# Patient Record
Sex: MALE | Race: WHITE | NOT HISPANIC OR LATINO | Employment: OTHER | URBAN - METROPOLITAN AREA
[De-identification: names, ages, dates, MRNs, and addresses within clinical notes are randomized per-mention and may not be internally consistent; named-entity substitution may affect disease eponyms.]

---

## 2017-03-23 ENCOUNTER — OFFICE VISIT (OUTPATIENT)
Dept: OPTOMETRY | Facility: CLINIC | Age: 72
End: 2017-03-23
Payer: COMMERCIAL

## 2017-03-23 ENCOUNTER — LAB VISIT (OUTPATIENT)
Dept: LAB | Facility: HOSPITAL | Age: 72
End: 2017-03-23
Attending: INTERNAL MEDICINE
Payer: COMMERCIAL

## 2017-03-23 ENCOUNTER — OFFICE VISIT (OUTPATIENT)
Dept: INTERNAL MEDICINE | Facility: CLINIC | Age: 72
End: 2017-03-23
Payer: COMMERCIAL

## 2017-03-23 VITALS
HEART RATE: 68 BPM | HEIGHT: 69 IN | BODY MASS INDEX: 26.51 KG/M2 | TEMPERATURE: 98 F | WEIGHT: 179 LBS | SYSTOLIC BLOOD PRESSURE: 138 MMHG | RESPIRATION RATE: 18 BRPM | DIASTOLIC BLOOD PRESSURE: 75 MMHG

## 2017-03-23 DIAGNOSIS — Z96.1 PSEUDOPHAKIA OF BOTH EYES: ICD-10-CM

## 2017-03-23 DIAGNOSIS — K76.0 FATTY LIVER: ICD-10-CM

## 2017-03-23 DIAGNOSIS — L30.9 DERMATITIS: ICD-10-CM

## 2017-03-23 DIAGNOSIS — N40.1 BPH WITH URINARY OBSTRUCTION: ICD-10-CM

## 2017-03-23 DIAGNOSIS — E78.5 HYPERLIPIDEMIA, UNSPECIFIED HYPERLIPIDEMIA TYPE: ICD-10-CM

## 2017-03-23 DIAGNOSIS — R97.20 ELEVATED PROSTATE SPECIFIC ANTIGEN (PSA): ICD-10-CM

## 2017-03-23 DIAGNOSIS — Z00.00 ANNUAL PHYSICAL EXAM: Primary | ICD-10-CM

## 2017-03-23 DIAGNOSIS — Z00.00 ANNUAL PHYSICAL EXAM: ICD-10-CM

## 2017-03-23 DIAGNOSIS — N13.8 BPH WITH URINARY OBSTRUCTION: ICD-10-CM

## 2017-03-23 DIAGNOSIS — Z23 NEED FOR STREPTOCOCCUS PNEUMONIAE VACCINATION: ICD-10-CM

## 2017-03-23 DIAGNOSIS — I10 ESSENTIAL HYPERTENSION: Primary | ICD-10-CM

## 2017-03-23 LAB
25(OH)D3+25(OH)D2 SERPL-MCNC: 23 NG/ML
ALBUMIN SERPL BCP-MCNC: 4.1 G/DL
ALP SERPL-CCNC: 73 U/L
ALT SERPL W/O P-5'-P-CCNC: 24 U/L
ANION GAP SERPL CALC-SCNC: 6 MMOL/L
AST SERPL-CCNC: 22 U/L
BASOPHILS # BLD AUTO: 0.04 K/UL
BASOPHILS NFR BLD: 0.6 %
BILIRUB SERPL-MCNC: 1 MG/DL
BILIRUB UR QL STRIP: NEGATIVE
BUN SERPL-MCNC: 18 MG/DL
CALCIUM SERPL-MCNC: 9.9 MG/DL
CHLORIDE SERPL-SCNC: 107 MMOL/L
CHOLEST/HDLC SERPL: 3.6 {RATIO}
CLARITY UR REFRACT.AUTO: CLEAR
CO2 SERPL-SCNC: 29 MMOL/L
COLOR UR AUTO: NORMAL
COMPLEXED PSA SERPL-MCNC: 4.1 NG/ML
CREAT SERPL-MCNC: 1.3 MG/DL
DIFFERENTIAL METHOD: ABNORMAL
EOSINOPHIL # BLD AUTO: 0.2 K/UL
EOSINOPHIL NFR BLD: 2.7 %
ERYTHROCYTE [DISTWIDTH] IN BLOOD BY AUTOMATED COUNT: 13 %
EST. GFR  (AFRICAN AMERICAN): >60 ML/MIN/1.73 M^2
EST. GFR  (NON AFRICAN AMERICAN): 55.7 ML/MIN/1.73 M^2
GLUCOSE SERPL-MCNC: 114 MG/DL
GLUCOSE UR QL STRIP: NEGATIVE
HCT VFR BLD AUTO: 43.9 %
HDL/CHOLESTEROL RATIO: 27.9 %
HDLC SERPL-MCNC: 136 MG/DL
HDLC SERPL-MCNC: 38 MG/DL
HGB BLD-MCNC: 15.7 G/DL
HGB UR QL STRIP: NEGATIVE
KETONES UR QL STRIP: NEGATIVE
LDLC SERPL CALC-MCNC: 80.2 MG/DL
LEUKOCYTE ESTERASE UR QL STRIP: NEGATIVE
LYMPHOCYTES # BLD AUTO: 1.3 K/UL
LYMPHOCYTES NFR BLD: 18.6 %
MCH RBC QN AUTO: 31.7 PG
MCHC RBC AUTO-ENTMCNC: 35.8 %
MCV RBC AUTO: 89 FL
MONOCYTES # BLD AUTO: 0.5 K/UL
MONOCYTES NFR BLD: 7.7 %
NEUTROPHILS # BLD AUTO: 4.9 K/UL
NEUTROPHILS NFR BLD: 70.3 %
NITRITE UR QL STRIP: NEGATIVE
NONHDLC SERPL-MCNC: 98 MG/DL
PH UR STRIP: 5 [PH] (ref 5–8)
PLATELET # BLD AUTO: 293 K/UL
PMV BLD AUTO: 9.8 FL
POTASSIUM SERPL-SCNC: 4.8 MMOL/L
PROT SERPL-MCNC: 7.5 G/DL
PROT UR QL STRIP: NEGATIVE
RBC # BLD AUTO: 4.96 M/UL
SODIUM SERPL-SCNC: 142 MMOL/L
SP GR UR STRIP: 1.01 (ref 1–1.03)
TRIGL SERPL-MCNC: 89 MG/DL
TSH SERPL DL<=0.005 MIU/L-ACNC: 1.23 UIU/ML
URN SPEC COLLECT METH UR: NORMAL
UROBILINOGEN UR STRIP-ACNC: NEGATIVE EU/DL
WBC # BLD AUTO: 7 K/UL

## 2017-03-23 PROCEDURE — 99397 PER PM REEVAL EST PAT 65+ YR: CPT | Mod: 25,S$GLB,, | Performed by: INTERNAL MEDICINE

## 2017-03-23 PROCEDURE — 3075F SYST BP GE 130 - 139MM HG: CPT | Mod: S$GLB,,, | Performed by: INTERNAL MEDICINE

## 2017-03-23 PROCEDURE — 90471 IMMUNIZATION ADMIN: CPT | Mod: S$GLB,,, | Performed by: INTERNAL MEDICINE

## 2017-03-23 PROCEDURE — 84153 ASSAY OF PSA TOTAL: CPT

## 2017-03-23 PROCEDURE — 84443 ASSAY THYROID STIM HORMONE: CPT

## 2017-03-23 PROCEDURE — 92014 COMPRE OPH EXAM EST PT 1/>: CPT | Mod: S$GLB,,, | Performed by: OPTOMETRIST

## 2017-03-23 PROCEDURE — 81003 URINALYSIS AUTO W/O SCOPE: CPT

## 2017-03-23 PROCEDURE — 3078F DIAST BP <80 MM HG: CPT | Mod: S$GLB,,, | Performed by: OPTOMETRIST

## 2017-03-23 PROCEDURE — 99999 PR PBB SHADOW E&M-EST. PATIENT-LVL III: CPT | Mod: PBBFAC,,, | Performed by: INTERNAL MEDICINE

## 2017-03-23 PROCEDURE — 82306 VITAMIN D 25 HYDROXY: CPT

## 2017-03-23 PROCEDURE — 3074F SYST BP LT 130 MM HG: CPT | Mod: S$GLB,,, | Performed by: OPTOMETRIST

## 2017-03-23 PROCEDURE — 85025 COMPLETE CBC W/AUTO DIFF WBC: CPT

## 2017-03-23 PROCEDURE — 3078F DIAST BP <80 MM HG: CPT | Mod: S$GLB,,, | Performed by: INTERNAL MEDICINE

## 2017-03-23 PROCEDURE — 99999 PR PBB SHADOW E&M-EST. PATIENT-LVL II: CPT | Mod: PBBFAC,,, | Performed by: OPTOMETRIST

## 2017-03-23 PROCEDURE — 80061 LIPID PANEL: CPT

## 2017-03-23 PROCEDURE — 80053 COMPREHEN METABOLIC PANEL: CPT

## 2017-03-23 PROCEDURE — 36415 COLL VENOUS BLD VENIPUNCTURE: CPT

## 2017-03-23 PROCEDURE — 83036 HEMOGLOBIN GLYCOSYLATED A1C: CPT

## 2017-03-23 PROCEDURE — 90670 PCV13 VACCINE IM: CPT | Mod: S$GLB,,, | Performed by: INTERNAL MEDICINE

## 2017-03-23 RX ORDER — CLOTRIMAZOLE AND BETAMETHASONE DIPROPIONATE 10; .64 MG/G; MG/G
CREAM TOPICAL 2 TIMES DAILY PRN
Qty: 60 G | Refills: 1 | Status: SHIPPED | OUTPATIENT
Start: 2017-03-23 | End: 2018-03-23

## 2017-03-23 NOTE — MR AVS SNAPSHOT
[facility]  [address]  [facility] [address]  Phone: [phone]  Fax: [phone]                  Jessee Dubose   3/23/2017 8:00 AM   Office Visit    Description:  Male : 1945   Provider:  Elizabeth Bunch MD   Department:  [facility]           Reason for Visit     Annual Exam           Diagnoses this Visit        Comments    Annual physical exam    -  Primary     BPH with urinary obstruction         Hyperlipidemia, unspecified hyperlipidemia type         Fatty liver         Elevated prostate specific antigen (PSA)         Need for Streptococcus pneumoniae vaccination         Dermatitis                To Do List           Future Appointments        Provider Department Dept Phone    3/23/2017 1:00 PM Chantell Guan, OD [facility] [phone]      Goals (5 Years of Data)     None      Follow-Up and Disposition     Return in about 1 year (around 3/23/2018).       These Medications        Disp Refills Start End    clotrimazole-betamethasone 1-0.05% (LOTRISONE) cream 60 g 1 3/23/2017 3/23/2018    Apply topically 2 (two) times daily as needed. - Topical (Top)    Pharmacy: [facility] [address] [place] [id number] [address]  #: [phone]         [facility]     [facility] Nurse Care [facility] -  Assistance  Registered nurses in the [facility] provide clinical advisement, health education, appointment booking, and other advisory services.  Call for this free service at [phone].             Medications           Message regarding Medications     Verify the changes and/or additions to your medication regime listed below are the same as discussed with your clinician today.  If any of these changes or additions are incorrect, please notify your healthcare provider.        START taking these NEW medications        Refills    clotrimazole-betamethasone 1-0.05% (LOTRISONE) cream 1    Sig: Apply topically 2 (two)  "times daily as needed.    Class: Normal    Route: Topical (Top)           Verify that the below list of medications is an accurate representation of the medications you are currently taking.  If none reported, the list may be blank. If incorrect, please contact your healthcare provider. Carry this list with you in case of emergency.           Current Medications     aspirin (ECOTRIN) 81 MG EC tablet Take 1 tablet (81 mg total) by mouth once daily.    atorvastatin (LIPITOR) 10 MG tablet TAKE ONE TABLET BY MOUTH ONCE DAILY    co-enzyme Q-10 30 mg capsule Take 30 mg by mouth 3 (three) times daily.    fish oil-omega-3 fatty acids 300-1,000 mg capsule Take by mouth once daily.    clotrimazole-betamethasone 1-0.05% (LOTRISONE) cream Apply topically 2 (two) times daily as needed.    olopatadine (PATANOL) 0.1 % ophthalmic solution Place 1 drop into both eyes 2 (two) times daily.           Clinical Reference Information           Your Vitals Were     BP Pulse Temp Resp Height Weight    138/75 68 98 °F (36.7 °C) (Oral) 18 5' 9" (1.753 m) 81.2 kg (179 lb 0.2 oz)    BMI                26.44 kg/m2          Blood Pressure          Most Recent Value    BP  138/75      Allergies as of 3/23/2017     Hay Fever And Allergy Relief      Immunizations Administered on Date of Encounter - 3/23/2017     Name Date Dose VIS Date Route    Pneumococcal Conjugate - 13 Valent  Incomplete 0.5 mL 11/5/2015 Intramuscular      Orders Placed During Today's Visit      Normal Orders This Visit    Pneumococcal Conjugate Vaccine (13 Valent) (IM)     Urinalysis     Future Labs/Procedures Expected by Expires    CBC auto differential  3/23/2017 5/22/2018    Comprehensive metabolic panel  3/23/2017 5/22/2018    Hemoglobin A1c  3/23/2017 5/22/2018    Lipid panel  3/23/2017 5/22/2018    PROSTATE SPECIFIC ANTIGEN, DIAGNOSTIC  3/23/2017 5/22/2018    TSH  3/23/2017 5/22/2018    Vitamin D  3/23/2017 5/22/2018      [facility] Sign-Up     Activating your [facility] " account is as easy as 1-2-3!     1) Visit [email or web address], select Sign Up Now, enter this activation code and your date of birth, then select Next.  [email or web address]  Expires: 5/7/2017  8:38 AM      2) Create a username and password to use when you visit [facility] in the future and select a security question in case you lose your password and select Next.    3) Enter your e-mail address and click Sign Up!    Additional Information  If you have questions, please e-mail [email or web address] or call [phone] to talk to our [facility] staff. Remember, [facility] is NOT to be used for urgent needs. For medical emergencies, dial 911.         Instructions    Metamucil over the counter daily. Drink plenty of water.        Language Assistance Services     ATTENTION: Language assistance services are available, free of charge. Please call [phone].      ATENCIÓN: Si habla aline, tiene a cifuentes disposición servicios gratuitos de asistencia lingüística. Llame al [phone].     MARIEL Ý: N?u b?n nói Ti?ng Vi?t, có các d?ch v? h? tr? ngôn ng? mi?n phí dành cho b?n. G?i s? [phone].         [facility] complies with applicable Federal civil rights laws and does not discriminate on the basis of race, color, national origin, age, disability, or sex.

## 2017-03-23 NOTE — PROGRESS NOTES
INTERNAL MEDICINE ANNUAL VISIT NOTE      CHIEF COMPLAINT     Chief Complaint   Patient presents with    Annual Exam       HPI     Jessee Dubose is a 71 y.o. C male who presents for annual exam.    Constipation and stool is sometimes hard. Uses OTC stool softener. Sometimes works. Tried prune juice but only sometimes works.  Reports eats a good amount of diet. Reports most of the time, 1 BM daily and most times hard like neelima per pt. No abdominal pains/blood in the stools. Sometimes does get an upset stomach when drinking coffee on an empty stomach.  Cscope 8/10/16 - normal. Repeat in 5 days.     Bikes about 20 min 5 days a week. No issues while biking. No CP/SOB/HOOVER.     Past Medical History:  Past Medical History:   Diagnosis Date    Allergy     Cataract     Diverticulosis     Hyperlipidemia     Joint pain     SMA stenosis     Steatosis of liver        Past Surgical History:  Past Surgical History:   Procedure Laterality Date    APPENDECTOMY      CATARACT EXTRACTION  11/4/13    right & left eye    CATARACT EXTRACTION W/  INTRAOCULAR LENS IMPLANT  11/18/13    Left Eye/ Dr Gupta    COLONOSCOPY N/A 8/10/2016    Procedure: COLONOSCOPY;  Surgeon: Adolfo Moore MD;  Location: [address];  Service: Endoscopy;  Laterality: N/A;  Patient has a history of Colon Polyps.       Allergies:  Review of patient's allergies indicates:   Allergen Reactions    Hay fever and allergy relief Other (See Comments)     Runny nose, sinus congestion.       Home Medications:  Prior to Admission medications    Medication Sig Start Date End Date Taking? Authorizing Provider   atorvastatin (LIPITOR) 10 MG tablet TAKE ONE TABLET BY MOUTH ONCE DAILY 6/13/16  Yes Elizabeth Bunch MD   co-enzyme Q-10 30 mg capsule Take 30 mg by mouth 3 (three) times daily.   Yes Historical Provider, MD   fish oil-omega-3 fatty acids 300-1,000 mg capsule Take by mouth once daily.   Yes Historical Provider, MD   aspirin (ECOTRIN) 81 MG EC tablet  "Take 1 tablet (81 mg total) by mouth once daily. 2/25/15 5/13/16  Moi Salazar MD   olopatadine (PATANOL) 0.1 % ophthalmic solution Place 1 drop into both eyes 2 (two) times daily. 10/1/12 8/10/16  Rodrigo Montalvo, OD       Family History:  Family History   Problem Relation Age of Onset    Heart disease Father 66     MI    No Known Problems Brother     Cataracts Maternal Grandmother     Diabetes Maternal Grandmother     Cancer Maternal Grandmother     Diabetes Paternal Grandmother     Glaucoma Neg Hx     Blindness Neg Hx     Melanoma Neg Hx        Social History:  Social History   Substance Use Topics    Smoking status: Never Smoker    Smokeless tobacco: None    Alcohol use Yes      Comment: 1 beer every few months       Review of Systems:  Review of Systems    Health Maintainence:   Immunizations:   TDap is up to date, Influenza is up to date, Pneumovax is up to date. Zostavax is UTD. Prevnar today.   Cancer Screening:  Colonoscopy: is up to date. 8/10/16  Screening:  Hepatitis C is up to date in pts born between 2914-5512.     PHYSICAL EXAM     BP (!) 142/78  Pulse 68  Temp 98 °F (36.7 °C) (Oral)   Resp 18  Ht 5' 9" (1.753 m)  Wt 81.2 kg (179 lb 0.2 oz)  BMI 26.44 kg/m2    GEN - A+OX4, NAD   HEENT - PERRL, EOMI, OP clear. MMM. Missing teeth.  Neck - No thyromegaly or cervical LAD. No thyroid masses felt.  CV - RRR, no m/r   Chest - CTAB, no wheezing or rhonchi  Abd - S/NT/ND/+BS.   Ext - 2+BPT and radial pulses. No LE edema.   Neuro - PERRL, EOMI, no nystagmus, eyebrow raise, facial sensation, hearing, m of mastication, smile, palatal raise, shoulder shrug, tongue protrusion symmetric and intact.  5/5 BUE and BLE strength. Sensation to light touch intact throughout. 2+ DTRs. Normal gait.   MSK - no spinal tenderness to palpation.   Skin - upper back w/ patches of redness.     LABS     Previous labs reviewed.     ASSESSMENT/PLAN     Jessee Dubose is a 71 y.o. male with  Jessee was " seen today for annual exam.    Diagnoses and all orders for this visit:    Annual physical exam  -     CBC auto differential; Future; Expected date: 3/23/17  -     Hemoglobin A1c; Future; Expected date: 3/23/17  -     Lipid panel; Future; Expected date: 3/23/17  -     Comprehensive metabolic panel; Future; Expected date: 3/23/17  -     TSH; Future; Expected date: 3/23/17  -     Vitamin D; Future; Expected date: 3/23/17  -     Urinalysis  -     PROSTATE SPECIFIC ANTIGEN, DIAGNOSTIC; Future; Expected date: 3/23/17    BPH with urinary obstruction - s/p benign TRUS biopsy 2016 w/ Dr. Grier.  -     Urinalysis  -     PROSTATE SPECIFIC ANTIGEN, DIAGNOSTIC; Future; Expected date: 3/23/17    Hyperlipidemia, unspecified hyperlipidemia type - cont atorvastatin 40mg daily especially given SMA stenosis. However asymptomatic. Seen Dr. VAZQUEZ in the past.  -     Lipid panel; Future; Expected date: 3/23/17  -     Comprehensive metabolic panel; Future; Expected date: 3/23/17    Fatty liver  -     Comprehensive metabolic panel; Future; Expected date: 3/23/17    Elevated prostate specific antigen (PSA)  -     Urinalysis  -     PROSTATE SPECIFIC ANTIGEN, DIAGNOSTIC; Future; Expected date: 3/23/17    Need for Streptococcus pneumoniae vaccination  -     Pneumococcal Conjugate Vaccine (13 Valent) (IM)    Dermatitis  -     clotrimazole-betamethasone 1-0.05% (LOTRISONE) cream; Apply topically 2 (two) times daily as needed.    Constipation -   Metamucil over the counter daily. Drink plenty of water.     RTC in 12 months, sooner if needed and depending on labs.    Elizabeth Bunch MD  Department of Internal Medicine - [facility]  8:18 AM

## 2017-03-23 NOTE — PROGRESS NOTES
HPI     Pt states: no changes in vision, here for ocular health check up. Using   otc readers +1.25 only. Using [facility] gtts apprx 2x per week    PATIENT'S LAST DFE WAS 1/11/2016       Last edited by Katia Headley, PCT on 3/23/2017  1:10 PM.     ROS     Negative for: Constitutional, Gastrointestinal, Neurological, Skin,   Genitourinary, Musculoskeletal, HENT, Endocrine, Cardiovascular, Eyes,   Respiratory, Psychiatric, Allergic/Imm, Heme/Lymph    Last edited by Chantell Guan, OD on 3/23/2017  1:55 PM. (History)        Assessment /Plan     For exam results, see Encounter Report.    Essential hypertension    Pseudophakia of both eyes            1.  No retinopathy--monitor yearly.  Eye health normal OU.  2.  No rx given.  Continue with OTC readers.           RTC 1 year for routine exam.

## 2017-03-24 LAB
ESTIMATED AVG GLUCOSE: 103 MG/DL
HBA1C MFR BLD HPLC: 5.2 %

## 2017-03-27 ENCOUNTER — TELEPHONE (OUTPATIENT)
Dept: INTERNAL MEDICINE | Facility: CLINIC | Age: 72
End: 2017-03-27

## 2017-03-27 NOTE — TELEPHONE ENCOUNTER
Please call and let pt know: PSA is very mildly elevated, which is much better than PSA level from last year. Follow up with Dr. Grier if he has any urinary symptoms. Vit D is low. I rec Vit D 1000 units OTC daily. Thyroid and liver function is normal. Kidney function is stable. Cholesterol is controlled. He does not have diabetes. Urinalysis is normal.

## 2017-03-29 ENCOUNTER — TELEPHONE (OUTPATIENT)
Dept: INTERNAL MEDICINE | Facility: CLINIC | Age: 72
End: 2017-03-29

## 2017-03-29 NOTE — TELEPHONE ENCOUNTER
----- Message from Jenny Bowie sent at 3/28/2017  3:48 PM CDT -----  Contact: pt   Patient would like to get test results.  Name of test (lab, mammo, etc.):  labs  Date of test:  3/23  Ordering provider: Alivia  Where was the test performed: Primary care   Comments:

## 2017-06-20 RX ORDER — ATORVASTATIN CALCIUM 10 MG/1
TABLET, FILM COATED ORAL
Qty: 30 TABLET | Refills: 11 | Status: SHIPPED | OUTPATIENT
Start: 2017-06-20 | End: 2017-06-21 | Stop reason: SDUPTHER

## 2017-06-21 RX ORDER — ATORVASTATIN CALCIUM 10 MG/1
10 TABLET, FILM COATED ORAL DAILY
Qty: 90 TABLET | Refills: 3 | Status: SHIPPED | OUTPATIENT
Start: 2017-06-21 | End: 2018-05-29 | Stop reason: SDUPTHER

## 2017-06-21 NOTE — TELEPHONE ENCOUNTER
----- Message from Mili Best sent at 6/20/2017  1:37 PM CDT -----  Contact: Self/[phone]  Pt said that he is calling in regards to needing to know if he can get a 90 day supply for his Atorvastatin, so he does not have to call the pharmacy every month.                 Thanks!!!!

## 2018-02-01 ENCOUNTER — TELEPHONE (OUTPATIENT)
Dept: INTERNAL MEDICINE | Facility: CLINIC | Age: 73
End: 2018-02-01

## 2018-02-01 DIAGNOSIS — Z00.00 ANNUAL PHYSICAL EXAM: Primary | ICD-10-CM

## 2018-02-01 NOTE — TELEPHONE ENCOUNTER
----- Message from Ivone Rudolph sent at 2/1/2018 10:42 AM CST -----  Contact: Patient [phone]  Sooner appointment than the  can schedule.  Did you offer to schedule the next available appointment and put the patient on the wait list?:    When is the first available appointment: 05/01/2018  What is the nature of the appointment: annual    Please call and advise.    Thank You

## 2018-02-02 NOTE — TELEPHONE ENCOUNTER
----- Message from Dahlia Hernandez sent at 2/2/2018  3:36 PM CST -----  Lab Orders Needed    I have scheduled the above patients annual physical. Lab orders need to be placed and scheduled.    Date of Annual Physical: 05/29/2018    Thank You

## 2018-05-22 ENCOUNTER — LAB VISIT (OUTPATIENT)
Dept: LAB | Facility: HOSPITAL | Age: 73
End: 2018-05-22
Attending: INTERNAL MEDICINE
Payer: COMMERCIAL

## 2018-05-22 DIAGNOSIS — Z00.00 ANNUAL PHYSICAL EXAM: ICD-10-CM

## 2018-05-22 LAB
ALBUMIN SERPL BCP-MCNC: 4.1 G/DL
ALP SERPL-CCNC: 69 U/L
ALT SERPL W/O P-5'-P-CCNC: 26 U/L
ANION GAP SERPL CALC-SCNC: 8 MMOL/L
AST SERPL-CCNC: 23 U/L
BASOPHILS # BLD AUTO: 0.03 K/UL
BASOPHILS NFR BLD: 0.4 %
BILIRUB SERPL-MCNC: 1 MG/DL
BUN SERPL-MCNC: 13 MG/DL
CALCIUM SERPL-MCNC: 9.4 MG/DL
CHLORIDE SERPL-SCNC: 105 MMOL/L
CHOLEST SERPL-MCNC: 158 MG/DL
CHOLEST/HDLC SERPL: 4.4 {RATIO}
CO2 SERPL-SCNC: 29 MMOL/L
COMPLEXED PSA SERPL-MCNC: 4.6 NG/ML
CREAT SERPL-MCNC: 1 MG/DL
DIFFERENTIAL METHOD: ABNORMAL
EOSINOPHIL # BLD AUTO: 0.2 K/UL
EOSINOPHIL NFR BLD: 3 %
ERYTHROCYTE [DISTWIDTH] IN BLOOD BY AUTOMATED COUNT: 12.9 %
EST. GFR  (AFRICAN AMERICAN): >60 ML/MIN/1.73 M^2
EST. GFR  (NON AFRICAN AMERICAN): >60 ML/MIN/1.73 M^2
ESTIMATED AVG GLUCOSE: 100 MG/DL
GLUCOSE SERPL-MCNC: 100 MG/DL
HBA1C MFR BLD HPLC: 5.1 %
HCT VFR BLD AUTO: 43.5 %
HDLC SERPL-MCNC: 36 MG/DL
HDLC SERPL: 22.8 %
HGB BLD-MCNC: 15.5 G/DL
LDLC SERPL CALC-MCNC: 93.4 MG/DL
LYMPHOCYTES # BLD AUTO: 1.5 K/UL
LYMPHOCYTES NFR BLD: 21.2 %
MCH RBC QN AUTO: 31.3 PG
MCHC RBC AUTO-ENTMCNC: 35.6 G/DL
MCV RBC AUTO: 88 FL
MONOCYTES # BLD AUTO: 0.5 K/UL
MONOCYTES NFR BLD: 6.8 %
NEUTROPHILS # BLD AUTO: 4.8 K/UL
NEUTROPHILS NFR BLD: 68.5 %
NONHDLC SERPL-MCNC: 122 MG/DL
PLATELET # BLD AUTO: 288 K/UL
PMV BLD AUTO: 9.6 FL
POTASSIUM SERPL-SCNC: 4.4 MMOL/L
PROT SERPL-MCNC: 7.2 G/DL
RBC # BLD AUTO: 4.96 M/UL
SODIUM SERPL-SCNC: 142 MMOL/L
TRIGL SERPL-MCNC: 143 MG/DL
TSH SERPL DL<=0.005 MIU/L-ACNC: 1.66 UIU/ML
WBC # BLD AUTO: 7.03 K/UL

## 2018-05-22 PROCEDURE — 36415 COLL VENOUS BLD VENIPUNCTURE: CPT

## 2018-05-22 PROCEDURE — 84443 ASSAY THYROID STIM HORMONE: CPT

## 2018-05-22 PROCEDURE — 80053 COMPREHEN METABOLIC PANEL: CPT

## 2018-05-22 PROCEDURE — 83036 HEMOGLOBIN GLYCOSYLATED A1C: CPT

## 2018-05-22 PROCEDURE — 85025 COMPLETE CBC W/AUTO DIFF WBC: CPT

## 2018-05-22 PROCEDURE — 84153 ASSAY OF PSA TOTAL: CPT

## 2018-05-22 PROCEDURE — 80061 LIPID PANEL: CPT

## 2018-05-29 ENCOUNTER — OFFICE VISIT (OUTPATIENT)
Dept: INTERNAL MEDICINE | Facility: CLINIC | Age: 73
End: 2018-05-29
Payer: COMMERCIAL

## 2018-05-29 VITALS
WEIGHT: 176.13 LBS | TEMPERATURE: 98 F | HEIGHT: 69 IN | DIASTOLIC BLOOD PRESSURE: 65 MMHG | BODY MASS INDEX: 26.09 KG/M2 | SYSTOLIC BLOOD PRESSURE: 140 MMHG | HEART RATE: 62 BPM

## 2018-05-29 DIAGNOSIS — K55.1 SMA STENOSIS: ICD-10-CM

## 2018-05-29 DIAGNOSIS — I70.0 AORTIC ATHEROSCLEROSIS: ICD-10-CM

## 2018-05-29 DIAGNOSIS — R97.20 ELEVATED PROSTATE SPECIFIC ANTIGEN (PSA): ICD-10-CM

## 2018-05-29 DIAGNOSIS — Z00.00 ANNUAL PHYSICAL EXAM: Primary | ICD-10-CM

## 2018-05-29 DIAGNOSIS — E78.5 HYPERLIPIDEMIA, UNSPECIFIED HYPERLIPIDEMIA TYPE: ICD-10-CM

## 2018-05-29 PROCEDURE — 99397 PER PM REEVAL EST PAT 65+ YR: CPT | Mod: S$GLB,,, | Performed by: INTERNAL MEDICINE

## 2018-05-29 PROCEDURE — 3078F DIAST BP <80 MM HG: CPT | Mod: CPTII,S$GLB,, | Performed by: INTERNAL MEDICINE

## 2018-05-29 PROCEDURE — 3077F SYST BP >= 140 MM HG: CPT | Mod: CPTII,S$GLB,, | Performed by: INTERNAL MEDICINE

## 2018-05-29 PROCEDURE — 99999 PR PBB SHADOW E&M-EST. PATIENT-LVL IV: CPT | Mod: PBBFAC,,, | Performed by: INTERNAL MEDICINE

## 2018-05-29 RX ORDER — ATORVASTATIN CALCIUM 10 MG/1
10 TABLET, FILM COATED ORAL DAILY
Qty: 90 TABLET | Refills: 3 | Status: SHIPPED | OUTPATIENT
Start: 2018-05-29 | End: 2019-03-13 | Stop reason: SDUPTHER

## 2018-05-29 NOTE — PROGRESS NOTES
INTERNAL MEDICINE ANNUAL VISIT NOTE      CHIEF COMPLAINT     ANNUAL    HPI     Jessee Dubose is a 72 y.o. C male who presents for annual exam.    Pt is well known to me.   Some rhinorrhea. Takes OTC antihistamine. Stationary bike occasionally - 20 min. Does not break a sweat. Does this 5 days a week.     H/o colitis in 2015. Mesenteric US w/ stenosis. Seen Dr. VAZQUEZ in 2015. Asymptomatic. No issues w/ abdominal pain. No bloody stools.     Past Medical History:  Past Medical History:   Diagnosis Date    Allergy     Cataract     Diverticulosis     Hyperlipidemia     Joint pain     SMA stenosis     Steatosis of liver        Past Surgical History:  Past Surgical History:   Procedure Laterality Date    APPENDECTOMY      CATARACT EXTRACTION  11/4/13    right & left eye    CATARACT EXTRACTION W/  INTRAOCULAR LENS IMPLANT  11/18/13    Left Eye/ Dr Gupta    COLONOSCOPY N/A 8/10/2016    Procedure: COLONOSCOPY;  Surgeon: Adolfo Moore MD;  Location: [address]);  Service: Endoscopy;  Laterality: N/A;  Patient has a history of Colon Polyps.       Allergies:  Review of patient's allergies indicates:   Allergen Reactions    Hay fever and allergy relief Other (See Comments)     Runny nose, sinus congestion.       Home Medications:  Prior to Admission medications    Medication Sig Start Date End Date Taking? Authorizing Provider   aspirin (ECOTRIN) 81 MG EC tablet Take 1 tablet (81 mg total) by mouth once daily. 2/25/15 3/23/17  Moi Salazar MD   atorvastatin (LIPITOR) 10 MG tablet Take 1 tablet (10 mg total) by mouth once daily. 6/21/17   Elizabeth Bunch MD   co-enzyme Q-10 30 mg capsule Take 30 mg by mouth 3 (three) times daily.    Historical Provider, MD   fish oil-omega-3 fatty acids 300-1,000 mg capsule Take by mouth once daily.    Historical Provider, MD   olopatadine (PATANOL) 0.1 % ophthalmic solution Place 1 drop into both eyes 2 (two) times daily. 10/1/12 8/10/16  Rodrigo Montalvo, OD  "      Family History:  Family History   Problem Relation Age of Onset    Heart disease Father 66        MI    No Known Problems Brother     Cataracts Maternal Grandmother     Diabetes Maternal Grandmother     Cancer Maternal Grandmother     Diabetes Paternal Grandmother     Glaucoma Neg Hx     Blindness Neg Hx     Melanoma Neg Hx        Social History:  Social History   Substance Use Topics    Smoking status: Never Smoker    Smokeless tobacco: Not on file    Alcohol use Yes      Comment: 1 beer every few months       Review of Systems:  Review of Systems   Constitutional: Negative for chills and fever.   HENT: Positive for rhinorrhea. Negative for congestion and postnasal drip.    Respiratory: Negative for cough, shortness of breath and wheezing.    Cardiovascular: Negative for chest pain and palpitations.   Gastrointestinal: Positive for constipation (occasional. takes fiber supplement and this works.). Negative for abdominal pain, blood in stool, diarrhea, nausea and vomiting.   Genitourinary: Negative for dysuria, frequency and hematuria.   Musculoskeletal: Negative.    Skin: Negative for rash.   Neurological: Positive for light-headedness (possibly once a mo. very transient. no other assoc symptoms). Negative for dizziness, weakness and headaches.       Health Maintainence:   Td 2011  Flu - out of season.  Prevnar 3/23/17  Pneumovax 3/3/16  Zoster 3/4/15  C-SCOPE 8/10/16 - repeat in 5 yrs. Normal   Hep C screening 11/1/13    PHYSICAL EXAM     BP (!) 140/65   Pulse 62   Temp 98.4 °F (36.9 °C)   Ht 5' 9" (1.753 m)   Wt 79.9 kg (176 lb 2.4 oz)   BMI 26.01 kg/m²     GEN - A+OX4, NAD   HEENT - PERRL, EOMI, OP clear. MMM.   Neck - No thyromegaly or cervical LAD. No thyroid masses felt.  CV - RRR, no m/r   Chest - CTAB, no wheezing or rhonchi  Abd - S/NT/ND/+BS.   Ext - 2+BDP and radial pulses. No LE edema.   Neuro - PERRL, EOMI, no nystagmus, eyebrow raise, facial sensation, hearing, m of mastication, " smile, palatal raise, shoulder shrug, tongue protrusion symmetric and intact. 5/5 BUE and BLE strength. Sensation to light touch intact throughout. 2+ DTRs. Normal gait.   MSK - No spinal tenderness to palpation. Normal gait.   Skin - No rash.    LABS     Previous labs reviewed.    Mesenteric US 2/23/15   SMA velocity is high suggesting SMA stenosis.    CT AP 2/22/15   Examination of the lung bases demonstrates no evidence of consolidation or pleural fluid.  Visualized portions of the heart demonstrates evidence of enlargement or disease.    The liver is normal in size demonstrating diffusely decreased attenuation relative to the spleen most compatible with hepatic steatosis.  If focal hepatic parenchymal and amount is identified.  No evidence of biliary system dilatation.  The gallbladder   is unremarkable.  The portal and splenic veins are patent.    The stomach, spleen, pancreas, and bilateral adrenal glands are unremarkable.  There is positive contrast identified within the distal esophagus which is nonspecific but may suggest reflux.    The bilateral kidneys are normal in size and location and enhance appropriately without evidence of solid renal masses or hydronephrosis.  Both kidneys concentrate and excrete contrast appropriately imaging.  No evidence for nephroureterolithiasis or   ureteral obstruction.  The urinary bladder and prostate demonstrate no focal abnormality.    The abdominal aorta is normal in course and caliber demonstrate moderate calcific atherosclerosis which extends into its branch vessels.    Examination of the colon demonstrates a short segment of diffuse mural thickening with portions of the distal descending colon wall measuring up to 6-7 mm in thickness. This segment contains a single diverticulum. There is mild pericolonic stranding of   the adjacent fat suggesting a reactive inflammatory process and edema. There is trace free fluid inferiorly in the lower left pelvis. No evidence of  obstruction in this region as positive contrast identified throughout the colon to the level of the cecum   after the initiation of rectal contrast.  No evidence of associated lymphadenopathy within the abdomen or pelvis.  No pneumatosis or intraperitoneal free air.  Overall these findings are most compatible with a focal colitis with an underlying neoplastic   process felt less likely although cannot be entirely excluded based on this examination.  The remaining portions of the large and small bowel demonstrate no convincing evidence of disease.      When viewed with bone windows the osseous structures are unremarkable.  The extraperitoneal soft tissues are unremarkable.      ASSESSMENT/PLAN     Jessee Dubose is a 72 y.o. male with  Jessee was seen today for annual exam.    Diagnoses and all orders for this visit:    Annual physical exam - reviewed labs w/ pt.     Hyperlipidemia, unspecified hyperlipidemia type  -     atorvastatin (LIPITOR) 10 MG tablet; Take 1 tablet (10 mg total) by mouth once daily.    Elevated prostate specific antigen (PSA) - f/u w/ Dr. Grier.  -     Ambulatory consult to Urology    SMA stenosis - cont asa 81 and atorva 10. If symptomatic or if worse, refer to vascular.  -     Vascular Lab () US Mesenteric Arterial; Future    Aortic atherosclerosis - asa 81 and atorva 10.    RTC in 12 months, sooner if needed and depending on labs.    Elizabeth Bunch MD  Department of Internal Medicine - [facility]  1:02 PM

## 2018-06-01 ENCOUNTER — OFFICE VISIT (OUTPATIENT)
Dept: UROLOGY | Facility: CLINIC | Age: 73
End: 2018-06-01
Payer: COMMERCIAL

## 2018-06-01 VITALS
BODY MASS INDEX: 25.72 KG/M2 | WEIGHT: 174.19 LBS | HEART RATE: 65 BPM | SYSTOLIC BLOOD PRESSURE: 153 MMHG | DIASTOLIC BLOOD PRESSURE: 71 MMHG

## 2018-06-01 DIAGNOSIS — R97.20 ELEVATED PSA: Primary | ICD-10-CM

## 2018-06-01 PROCEDURE — 3078F DIAST BP <80 MM HG: CPT | Mod: CPTII,S$GLB,, | Performed by: UROLOGY

## 2018-06-01 PROCEDURE — 99203 OFFICE O/P NEW LOW 30 MIN: CPT | Mod: S$GLB,,, | Performed by: UROLOGY

## 2018-06-01 PROCEDURE — 99999 PR PBB SHADOW E&M-EST. PATIENT-LVL III: CPT | Mod: PBBFAC,,,

## 2018-06-01 PROCEDURE — 3077F SYST BP >= 140 MM HG: CPT | Mod: CPTII,S$GLB,, | Performed by: UROLOGY

## 2018-06-01 NOTE — H&P
Subjective:       Patient ID: Jessee Dubose is a 72 y.o. male.    Chief Complaint: Elevated PSA      HPI: Jessee Dubose is a 72 y.o. White male who presents today for evaluation and management of an elevated PSA.    Underwent negative TRUS biopsy with Dr. Grier in 2016 PSA at that time was 7.3    PSA most recently is 4.6,  He endorses recent masturbation at time lab was drawn and rides a stationary bike daily. He does not take any medications for his prostate and denies taking finasteride.     The patient does not have a family history of prostate cancer or other  malignancy.     He denies hematuria and/or UTI symptoms.    He denies any voiding symptoms, nocturia x1    He has ED but is not sexually active with his wife and not interested in treating this      Review of patient's allergies indicates:   Allergen Reactions    Hay fever and allergy relief Other (See Comments)     Runny nose, sinus congestion.       Current Outpatient Prescriptions   Medication Sig Dispense Refill    aspirin (ECOTRIN) 81 MG EC tablet Take 1 tablet (81 mg total) by mouth once daily. 360 tablet 11    atorvastatin (LIPITOR) 10 MG tablet Take 1 tablet (10 mg total) by mouth once daily. 90 tablet 3    co-enzyme Q-10 30 mg capsule Take 30 mg by mouth 3 (three) times daily.      fish oil-omega-3 fatty acids 300-1,000 mg capsule Take by mouth once daily.      olopatadine (PATANOL) 0.1 % ophthalmic solution Place 1 drop into both eyes 2 (two) times daily. 5 mL 6     No current facility-administered medications for this visit.        Past Medical History:   Diagnosis Date    Allergy     Cataract     Diverticulosis     Hyperlipidemia     Joint pain     SMA stenosis     Steatosis of liver        Past Surgical History:   Procedure Laterality Date    APPENDECTOMY      CATARACT EXTRACTION  11/4/13    right & left eye    CATARACT EXTRACTION W/  INTRAOCULAR LENS IMPLANT  11/18/13    Left Eye/ Dr Gupta    COLONOSCOPY N/A  8/10/2016    Procedure: COLONOSCOPY;  Surgeon: Adolfo Moore MD;  Location: [facility] ([address] [place] [address]);  Service: Endoscopy;  Laterality: N/A;  Patient has a history of Colon Polyps.       Family History   Problem Relation Age of Onset    Heart disease Father 66        MI    No Known Problems Brother     Cataracts Maternal Grandmother     Diabetes Maternal Grandmother     Cancer Maternal Grandmother     Diabetes Paternal Grandmother     Glaucoma Neg Hx     Blindness Neg Hx     Melanoma Neg Hx        Review of Systems     Patient denies bleeding diathesis, chills, decreased size/force of stream, dysuria, fever, flank pain, frequency or urgency, hematuria, hesitancy, intermittency or feeling of incomplete emptying, stones, stress or urgency incontinence, TB or genitourinary trauma and urethral discharge.    Review of Systems   Constitutional: Negative for fever, chills, activity change, appetite change and unexpected weight change.   HENT: Negative for congestion, nosebleeds, sneezing, sore throat and trouble swallowing.    Eyes: Negative for pain, discharge, redness and visual disturbance.   Respiratory: Negative for cough, choking, chest tightness and shortness of breath.    Cardiovascular: Negative for chest pain, palpitations and leg swelling.   Gastrointestinal: Negative for nausea, vomiting, abdominal pain, diarrhea, blood in stool, abdominal distention and anal bleeding.  Genitourinary: As documented per HPI   Endocrine: Negative for cold intolerance, heat intolerance, polydipsia, polyphagia and polyuria.   Musculoskeletal: Negative for myalgias, gait problem, neck pain and neck stiffness.   Skin: Negative for color change, pallor, rash and wound.   Allergic/Immunologic: Negative for immunocompromised state.   Neurological: Negative for seizures, facial asymmetry, speech difficulty, weakness and light-headedness.   Hematological: Negative for adenopathy. Does not bruise/bleed easily.    Psychiatric/Behavioral: Negative for hallucinations, behavioral problems, self-injury and agitation. The patient is not hyperactive.    All other systems were reviewed and were negative.      Objective:     Vitals:    06/01/18 1439   BP: (!) 153/71   Pulse: 65     Physical Exam   Vitals reviewed.  Constitutional: He is oriented to person, place, and time. He appears well-developed and well-nourished. No distress.   HENT:   Head: Normocephalic and atraumatic.   Right Ear: External ear normal.   Left Ear: External ear normal.   Nose: Nose normal.   Eyes: EOM are normal. Pupils are equal, round, and reactive to light. Right eye exhibits no discharge. Left eye exhibits no discharge.   Neck: Normal range of motion. Neck supple. No tracheal deviation present. No thyroid enlargement or tenderness.  Cardiovascular: Regular rhythm and intact distal pulses. No signs of peripheral edema.    Pulmonary/Chest: Effort normal and breath sounds normal. No stridor. No respiratory distress. He has no wheezes.   Abdominal: Soft. Bowel sounds are normal. He exhibits no distension. There is no tenderness. Hernia confirmed negative in the right inguinal area and confirmed negative in the left inguinal area. No hepatic or splenic enlargement or tenderness.  Genitourinary: Penis normal. Right testis shows no mass, no swelling and no tenderness. Right testis is descended. Left testis shows no mass, no swelling and no tenderness. Left testis is descended. Circumcised. No phimosis or hypospadias.   SANGITA: 30g no nodules, smooth, no masses in rectum  Musculoskeletal: Normal range of motion. He exhibits no edema.   Neurological: He is alert and oriented to person, place, and time. He exhibits normal muscle tone. Coordination normal.   Skin: Skin is warm. No rash noted.   Lymphatic: No palpable lymph nodes.  Psychiatric: He has a normal mood and affect. His behavior is normal. Judgment and thought content normal.     Lab Results   Component Value  Date    PSA 4.6 (H) 05/22/2018    PSA 7.3 (H) 03/03/2016    PSA 3.6 10/08/2013     Lab Results   Component Value Date    CREATININE 1.0 05/22/2018     Lab Results   Component Value Date    EGFRNONAA >60 05/22/2018     Lab Results   Component Value Date    ESTGFRAFRICA >60 05/22/2018         Assessment:       1. Elevated PSA        Plan:     Jessee was seen today for elevated psa.    Diagnoses and all orders for this visit:    Elevated PSA  -     PSA, Screening; Future        The patient has an elevated PSA. I discussed with the patient the finding of an abnormal PSA test.  The patient is aware that PSA is a protein made by the prostate in both benign and malignant conditions.  We discussed the most common differential diagnosis of an elevated PSA including BPH, prostatitis (chronic and acute), PIN (a pre-cancerous condition of the prostate) and prostate cancer.  The issues of sensitivity,   specificity and the predictive values of PSA were discussed in detail.  The   patient is aware that the relevant issues regarding his risk of having prostate cancer are his PSA and any abnormalities of a SANGITA. It was explained to the patient in detail that not all men with an elevated PSA have prostate cancer nor do all men without prostate cancer have a normal PSA. We had a long discussion about the benefits and limitations of PSA testing/screening. Although imperfect, PSA testing does have some utility, and I explained what I thought were the benefits of PSA screening. Importantly, it can be helpful in identifying at risk patients for a significant prostate cancer. We spoke extensively about about his prior negative biopsy with previously higher PSA and options including no screening, MRI and repeat PSA in 1 year    After our discussion, He elected to have a repeat PSA in 1 year.    I encouraged him or any of his family members to call or email me with questions and/or concerns.    I spent 30 minutes with the patient of which  more than half was spent in coordinating the patient's care as well as in direct consultation with the patient in regards to our treatment and plan.

## 2018-06-02 NOTE — PROGRESS NOTES
I have reviewed and concur with the resident's history, physical, assessment, and plan.  I have personally interviewed and examined the patient at bedside.      Informed conversation had regarding psa screening.  Also discussed age-adjusted psa's and his current psa is wnl fo rhis age.

## 2018-07-06 RX ORDER — ATORVASTATIN CALCIUM 10 MG/1
TABLET, FILM COATED ORAL
Qty: 30 TABLET | Refills: 4 | Status: SHIPPED | OUTPATIENT
Start: 2018-07-06 | End: 2019-03-13

## 2019-03-04 ENCOUNTER — TELEPHONE (OUTPATIENT)
Dept: ADMINISTRATIVE | Facility: HOSPITAL | Age: 74
End: 2019-03-04

## 2019-03-04 DIAGNOSIS — I10 HYPERTENSION, UNSPECIFIED TYPE: ICD-10-CM

## 2019-03-04 DIAGNOSIS — E78.5 HYPERLIPIDEMIA, UNSPECIFIED HYPERLIPIDEMIA TYPE: ICD-10-CM

## 2019-03-04 DIAGNOSIS — Z00.00 ANNUAL PHYSICAL EXAM: Primary | ICD-10-CM

## 2019-03-04 DIAGNOSIS — R97.20 ELEVATED PSA: ICD-10-CM

## 2019-03-13 ENCOUNTER — OFFICE VISIT (OUTPATIENT)
Dept: INTERNAL MEDICINE | Facility: CLINIC | Age: 74
End: 2019-03-13
Payer: COMMERCIAL

## 2019-03-13 ENCOUNTER — LAB VISIT (OUTPATIENT)
Dept: LAB | Facility: HOSPITAL | Age: 74
End: 2019-03-13
Payer: COMMERCIAL

## 2019-03-13 VITALS
DIASTOLIC BLOOD PRESSURE: 70 MMHG | TEMPERATURE: 98 F | SYSTOLIC BLOOD PRESSURE: 134 MMHG | HEIGHT: 69 IN | WEIGHT: 178.13 LBS | BODY MASS INDEX: 26.38 KG/M2 | HEART RATE: 62 BPM

## 2019-03-13 DIAGNOSIS — I70.0 AORTIC ATHEROSCLEROSIS: ICD-10-CM

## 2019-03-13 DIAGNOSIS — K76.0 FATTY LIVER: ICD-10-CM

## 2019-03-13 DIAGNOSIS — E78.5 HYPERLIPIDEMIA, UNSPECIFIED HYPERLIPIDEMIA TYPE: ICD-10-CM

## 2019-03-13 DIAGNOSIS — L30.9 DERMATITIS: ICD-10-CM

## 2019-03-13 DIAGNOSIS — R97.20 ELEVATED PSA: ICD-10-CM

## 2019-03-13 DIAGNOSIS — Z00.00 ANNUAL PHYSICAL EXAM: Primary | ICD-10-CM

## 2019-03-13 DIAGNOSIS — R97.20 ELEVATED PROSTATE SPECIFIC ANTIGEN (PSA): ICD-10-CM

## 2019-03-13 DIAGNOSIS — K55.1 SMA STENOSIS: ICD-10-CM

## 2019-03-13 DIAGNOSIS — Z00.00 ANNUAL PHYSICAL EXAM: ICD-10-CM

## 2019-03-13 LAB
ALBUMIN SERPL BCP-MCNC: 4.3 G/DL
ALP SERPL-CCNC: 74 U/L
ALT SERPL W/O P-5'-P-CCNC: 35 U/L
ANION GAP SERPL CALC-SCNC: 11 MMOL/L
AST SERPL-CCNC: 26 U/L
BASOPHILS # BLD AUTO: 0.06 K/UL
BASOPHILS NFR BLD: 1 %
BILIRUB SERPL-MCNC: 1.2 MG/DL
BUN SERPL-MCNC: 16 MG/DL
CALCIUM SERPL-MCNC: 9.9 MG/DL
CHLORIDE SERPL-SCNC: 102 MMOL/L
CHOLEST SERPL-MCNC: 158 MG/DL
CHOLEST/HDLC SERPL: 4.4 {RATIO}
CO2 SERPL-SCNC: 28 MMOL/L
COMPLEXED PSA SERPL-MCNC: 4.6 NG/ML
CREAT SERPL-MCNC: 1.1 MG/DL
DIFFERENTIAL METHOD: NORMAL
EOSINOPHIL # BLD AUTO: 0.1 K/UL
EOSINOPHIL NFR BLD: 2.1 %
ERYTHROCYTE [DISTWIDTH] IN BLOOD BY AUTOMATED COUNT: 13.2 %
EST. GFR  (AFRICAN AMERICAN): >60 ML/MIN/1.73 M^2
EST. GFR  (NON AFRICAN AMERICAN): >60 ML/MIN/1.73 M^2
GLUCOSE SERPL-MCNC: 109 MG/DL
HCT VFR BLD AUTO: 44.9 %
HDLC SERPL-MCNC: 36 MG/DL
HDLC SERPL: 22.8 %
HGB BLD-MCNC: 15.4 G/DL
INR PPP: 0.9
LDLC SERPL CALC-MCNC: 95 MG/DL
LYMPHOCYTES # BLD AUTO: 1.2 K/UL
LYMPHOCYTES NFR BLD: 21.1 %
MCH RBC QN AUTO: 30.7 PG
MCHC RBC AUTO-ENTMCNC: 34.3 G/DL
MCV RBC AUTO: 89 FL
MONOCYTES # BLD AUTO: 0.5 K/UL
MONOCYTES NFR BLD: 8.8 %
NEUTROPHILS # BLD AUTO: 3.9 K/UL
NEUTROPHILS NFR BLD: 66.7 %
NONHDLC SERPL-MCNC: 122 MG/DL
PLATELET # BLD AUTO: 289 K/UL
PMV BLD AUTO: 9.3 FL
POTASSIUM SERPL-SCNC: 4.4 MMOL/L
PROT SERPL-MCNC: 7.6 G/DL
PROTHROMBIN TIME: 9.8 SEC
RBC # BLD AUTO: 5.02 M/UL
SODIUM SERPL-SCNC: 141 MMOL/L
TRIGL SERPL-MCNC: 135 MG/DL
WBC # BLD AUTO: 5.82 K/UL

## 2019-03-13 PROCEDURE — 99397 PR PREVENTIVE VISIT,EST,65 & OVER: ICD-10-PCS | Mod: S$GLB,,, | Performed by: INTERNAL MEDICINE

## 2019-03-13 PROCEDURE — 36415 COLL VENOUS BLD VENIPUNCTURE: CPT

## 2019-03-13 PROCEDURE — 85610 PROTHROMBIN TIME: CPT

## 2019-03-13 PROCEDURE — 80053 COMPREHEN METABOLIC PANEL: CPT

## 2019-03-13 PROCEDURE — 99397 PER PM REEVAL EST PAT 65+ YR: CPT | Mod: S$GLB,,, | Performed by: INTERNAL MEDICINE

## 2019-03-13 PROCEDURE — 84153 ASSAY OF PSA TOTAL: CPT

## 2019-03-13 PROCEDURE — 3075F SYST BP GE 130 - 139MM HG: CPT | Mod: CPTII,S$GLB,, | Performed by: INTERNAL MEDICINE

## 2019-03-13 PROCEDURE — 3075F PR MOST RECENT SYSTOLIC BLOOD PRESS GE 130-139MM HG: ICD-10-PCS | Mod: CPTII,S$GLB,, | Performed by: INTERNAL MEDICINE

## 2019-03-13 PROCEDURE — 3078F DIAST BP <80 MM HG: CPT | Mod: CPTII,S$GLB,, | Performed by: INTERNAL MEDICINE

## 2019-03-13 PROCEDURE — 85025 COMPLETE CBC W/AUTO DIFF WBC: CPT

## 2019-03-13 PROCEDURE — 80061 LIPID PANEL: CPT

## 2019-03-13 PROCEDURE — 99999 PR PBB SHADOW E&M-EST. PATIENT-LVL III: ICD-10-PCS | Mod: PBBFAC,,, | Performed by: INTERNAL MEDICINE

## 2019-03-13 PROCEDURE — 3078F PR MOST RECENT DIASTOLIC BLOOD PRESSURE < 80 MM HG: ICD-10-PCS | Mod: CPTII,S$GLB,, | Performed by: INTERNAL MEDICINE

## 2019-03-13 PROCEDURE — 99999 PR PBB SHADOW E&M-EST. PATIENT-LVL III: CPT | Mod: PBBFAC,,, | Performed by: INTERNAL MEDICINE

## 2019-03-13 RX ORDER — ATORVASTATIN CALCIUM 10 MG/1
10 TABLET, FILM COATED ORAL DAILY
Qty: 90 TABLET | Refills: 3 | Status: SHIPPED | OUTPATIENT
Start: 2019-03-13 | End: 2019-07-22 | Stop reason: SDUPTHER

## 2019-03-13 RX ORDER — CLOTRIMAZOLE AND BETAMETHASONE DIPROPIONATE 10; .64 MG/G; MG/G
CREAM TOPICAL 2 TIMES DAILY
Qty: 45 G | Refills: 3 | Status: SHIPPED | OUTPATIENT
Start: 2019-03-13 | End: 2020-08-05 | Stop reason: SDUPTHER

## 2019-03-13 NOTE — PROGRESS NOTES
"INTERNAL MEDICINE ANNUAL VISIT NOTE      CHIEF COMPLAINT     ANNUAL    HPI     Jessee Dubose is a 73 y.o. C male who presents for annual exam.    HLD - atorvastatin 10mg daily.    Elevated PSA. Due for repeat. Saw urology 2018. Sometimes a little foamy in the urine. No hesitancy, urgency or frequency. No dysuria or hematuria.     H/o colitis in 2015. Mesenteric US w/ stenosis. Seen Dr. VAZQUEZ in 2015. Asymptomatic. No issues w/ abdominal pain. No bloody stools.     Lives w/ wife. No assistance w/ ADLs. Does not drive. Wife drives him around. Hasn't been exercising much.     CT A/P 2015 "The abdominal aorta is normal in course and caliber demonstrate moderate calcific atherosclerosis which extends into its branch vessels."    Past Medical History:  Past Medical History:   Diagnosis Date    Allergy     Cataract     Diverticulosis     Hyperlipidemia     Joint pain     SMA stenosis     Steatosis of liver        Past Surgical History:  Past Surgical History:   Procedure Laterality Date    APPENDECTOMY      CATARACT EXTRACTION  11/4/13    right & left eye    CATARACT EXTRACTION W/  INTRAOCULAR LENS IMPLANT  11/18/13    Left Eye/ Dr Gupta    COLONOSCOPY N/A 8/10/2016    Performed by [facility]fo Moore [facility] at [facility] (4TH FLR)    COLONOSCOPY N/A 7/31/2013    Performed by Douglas Holland MD at [facility] ENDO (4TH FLR)    INSERTION, IOL PROSTHESIS Left 11/18/2013    [facility] at [facility] OR    INSERTION, IOL PROSTHESIS Right 11/4/2013    Per[facility] at [facility] OR    PHACOEMULSIFICATION, CATARACT Left 11/18/2013    Per[facility] at [facility] OR    PHACOEMULSIFICATION, CATARACT Right 11/4/2013    Performed by [facility] Gupta MD at [facility] OR       Allergies:  Review of patient's allergies indicates:   Allergen Reactions    Hay fever and allergy relief Other (See Comments)     Runny nose, sinus congestion.       Home Medications:  Prior to Admission medications    Medication " "Sig Start Date End Date Taking? Authorizing Provider   aspirin (ECOTRIN) 81 MG EC tablet Take 1 tablet (81 mg total) by mouth once daily. 2/25/15 5/29/18  Moi Salazar MD   atorvastatin (LIPITOR) 10 MG tablet Take 1 tablet (10 mg total) by mouth once daily. 5/29/18   Elizabeth Bunch MD   atorvastatin (LIPITOR) 10 MG tablet TAKE ONE TABLET BY MOUTH ONCE DAILY 7/6/18   Tarsha Moraes MD   co-enzyme Q-10 30 mg capsule Take 30 mg by mouth 3 (three) times daily.    Historical Provider, MD   fish oil-omega-3 fatty acids 300-1,000 mg capsule Take by mouth once daily.    Historical Provider, MD   olopatadine (PATANOL) 0.1 % ophthalmic solution Place 1 drop into both eyes 2 (two) times daily. 10/1/12 5/29/18  Rodrigo Montalvo, HEMANT       Family History:  Family History   Problem Relation Age of Onset    Heart disease Father 66        MI    No Known Problems Brother     Cataracts Maternal Grandmother     Diabetes Maternal Grandmother     Cancer Maternal Grandmother     Diabetes Paternal Grandmother     Glaucoma Neg Hx     Blindness Neg Hx     Melanoma Neg Hx        Social History:  Social History     Tobacco Use    Smoking status: Never Smoker   Substance Use Topics    Alcohol use: Yes     Comment: 1 beer every few months    Drug use: No       Review of Systems:  Review of Systems Comprehensive review of systems otherwise negative. See history/subjective section for more details.    Health Maintainence:    reviewed. Declines flu vaccine.    PHYSICAL EXAM     /70 (BP Location: Left arm, Patient Position: Sitting, BP Method: Medium (Manual))   Pulse 62   Temp 97.8 °F (36.6 °C)   Ht 5' 9" (1.753 m)   Wt 80.8 kg (178 lb 2.1 oz)   BMI 26.31 kg/m²     GEN - A+OX4, NAD   HEENT - PERRL, EOMI, OP clear. MMM.   Neck - No thyromegaly or cervical LAD. No thyroid masses felt.  CV - RRR, no m/r   Chest - CTAB, no wheezing or rhonchi  Abd - S/NT/ND/+BS.   Ext - 2+BDP and radial pulses. No LE edema.   Neuro - " PERRL, EOMI, no nystagmus, eyebrow raise, facial sensation, hearing, m of mastication, smile, palatal raise, shoulder shrug, tongue protrusion symmetric and intact. 5/5 BUE and BLE strength. Sensation to light touch intact throughout. 2+ DTRs. Normal gait.   MSK - No spinal tenderness to palpation. Normal gait.   Skin - No rash currently    LABS     Previous labs reviewed.    ASSESSMENT/PLAN     Jessee Dubose is a 73 y.o. male with  Jessee was seen today for annual exam.    Diagnoses and all orders for this visit:    Annual physical exam  -     CBC auto differential; Future; Expected date: 03/13/2019  -     Comprehensive metabolic panel; Future; Expected date: 03/13/2019  -     Lipid panel; Future; Expected date: 03/13/2019    Elevated prostate specific antigen (PSA) - recheck PSA ordered by Dr. Rushing    Hyperlipidemia, unspecified hyperlipidemia type - recheck FLP.   -     atorvastatin (LIPITOR) 10 MG tablet; Take 1 tablet (10 mg total) by mouth once daily.    Dermatitis - refilled lotrisone to use prn.  -     clotrimazole-betamethasone 1-0.05% (LOTRISONE) cream; Apply topically 2 (two) times daily.    Fatty liver - check LFT.  -     Protime-INR; Future; Expected date: 03/13/2019  -     US Abdomen Complete; Future; Expected date: 03/13/2019    Aortic atherosclerosis - cont atorvastatin.     SMA stenosis - cont asa 81 and atorvastatin. Asymptomatic. If abdominal pain, bloody stools, nausea/vomiting, pt to let us know right away or to go to ED.     Declines flu vaccine.       RTC in 12 months, sooner if needed and depending on labs.    Elizabeth Bunch MD  Department of Internal Medicine - [facility]  7:45 AM

## 2019-03-14 ENCOUNTER — TELEPHONE (OUTPATIENT)
Dept: UROLOGY | Facility: CLINIC | Age: 74
End: 2019-03-14

## 2019-04-08 ENCOUNTER — HOSPITAL ENCOUNTER (OUTPATIENT)
Dept: RADIOLOGY | Facility: HOSPITAL | Age: 74
Discharge: HOME | End: 2019-04-08
Attending: INTERNAL MEDICINE
Payer: COMMERCIAL

## 2019-04-08 DIAGNOSIS — K76.0 FATTY LIVER: ICD-10-CM

## 2019-04-08 PROCEDURE — 76700 US EXAM ABDOM COMPLETE: CPT | Mod: TC

## 2019-04-08 PROCEDURE — 76700 US EXAM ABDOM COMPLETE: CPT | Mod: 26,,, | Performed by: INTERNAL MEDICINE

## 2019-04-08 PROCEDURE — 76700 US ABDOMEN COMPLETE: ICD-10-PCS | Mod: 26,,, | Performed by: INTERNAL MEDICINE

## 2019-07-22 DIAGNOSIS — E78.5 HYPERLIPIDEMIA, UNSPECIFIED HYPERLIPIDEMIA TYPE: ICD-10-CM

## 2019-07-22 RX ORDER — ATORVASTATIN CALCIUM 10 MG/1
10 TABLET, FILM COATED ORAL DAILY
Qty: 90 TABLET | Refills: 3 | Status: SHIPPED | OUTPATIENT
Start: 2019-07-22 | End: 2020-08-05

## 2020-08-05 ENCOUNTER — OFFICE VISIT (OUTPATIENT)
Dept: INTERNAL MEDICINE | Facility: CLINIC | Age: 75
End: 2020-08-05
Payer: COMMERCIAL

## 2020-08-05 ENCOUNTER — IMMUNIZATION (OUTPATIENT)
Dept: PHARMACY | Facility: CLINIC | Age: 75
End: 2020-08-05
Payer: COMMERCIAL

## 2020-08-05 VITALS
RESPIRATION RATE: 16 BRPM | DIASTOLIC BLOOD PRESSURE: 80 MMHG | HEART RATE: 79 BPM | SYSTOLIC BLOOD PRESSURE: 130 MMHG | HEIGHT: 69 IN | WEIGHT: 169 LBS | BODY MASS INDEX: 25.03 KG/M2 | OXYGEN SATURATION: 97 % | TEMPERATURE: 98 F

## 2020-08-05 DIAGNOSIS — Z12.83 SKIN CANCER SCREENING: ICD-10-CM

## 2020-08-05 DIAGNOSIS — R97.20 ELEVATED PROSTATE SPECIFIC ANTIGEN (PSA): ICD-10-CM

## 2020-08-05 DIAGNOSIS — L30.9 DERMATITIS: ICD-10-CM

## 2020-08-05 DIAGNOSIS — K55.1 SMA STENOSIS: ICD-10-CM

## 2020-08-05 DIAGNOSIS — E78.5 HYPERLIPIDEMIA, UNSPECIFIED HYPERLIPIDEMIA TYPE: ICD-10-CM

## 2020-08-05 DIAGNOSIS — Z00.00 ANNUAL PHYSICAL EXAM: Primary | ICD-10-CM

## 2020-08-05 DIAGNOSIS — I70.0 AORTIC ATHEROSCLEROSIS: ICD-10-CM

## 2020-08-05 DIAGNOSIS — Z01.84 ANTIBODY RESPONSE EXAM: ICD-10-CM

## 2020-08-05 DIAGNOSIS — K63.5 POLYP OF COLON, UNSPECIFIED PART OF COLON, UNSPECIFIED TYPE: ICD-10-CM

## 2020-08-05 PROCEDURE — 99397 PR PREVENTIVE VISIT,EST,65 & OVER: ICD-10-PCS | Mod: S$GLB,,, | Performed by: INTERNAL MEDICINE

## 2020-08-05 PROCEDURE — 99397 PER PM REEVAL EST PAT 65+ YR: CPT | Mod: S$GLB,,, | Performed by: INTERNAL MEDICINE

## 2020-08-05 PROCEDURE — 3075F SYST BP GE 130 - 139MM HG: CPT | Mod: CPTII,S$GLB,, | Performed by: INTERNAL MEDICINE

## 2020-08-05 PROCEDURE — 99999 PR PBB SHADOW E&M-EST. PATIENT-LVL IV: CPT | Mod: PBBFAC,,, | Performed by: INTERNAL MEDICINE

## 2020-08-05 PROCEDURE — 99999 PR PBB SHADOW E&M-EST. PATIENT-LVL IV: ICD-10-PCS | Mod: PBBFAC,,, | Performed by: INTERNAL MEDICINE

## 2020-08-05 PROCEDURE — 3079F PR MOST RECENT DIASTOLIC BLOOD PRESSURE 80-89 MM HG: ICD-10-PCS | Mod: CPTII,S$GLB,, | Performed by: INTERNAL MEDICINE

## 2020-08-05 PROCEDURE — 3079F DIAST BP 80-89 MM HG: CPT | Mod: CPTII,S$GLB,, | Performed by: INTERNAL MEDICINE

## 2020-08-05 PROCEDURE — 3075F PR MOST RECENT SYSTOLIC BLOOD PRESS GE 130-139MM HG: ICD-10-PCS | Mod: CPTII,S$GLB,, | Performed by: INTERNAL MEDICINE

## 2020-08-05 RX ORDER — ATORVASTATIN CALCIUM 40 MG/1
40 TABLET, FILM COATED ORAL DAILY
Qty: 90 TABLET | Refills: 3 | Status: SHIPPED | OUTPATIENT
Start: 2020-08-05 | End: 2021-08-04

## 2020-08-05 RX ORDER — ASPIRIN 81 MG/1
81 TABLET ORAL DAILY
Qty: 360 TABLET | Refills: 0 | COMMUNITY
Start: 2020-08-05 | End: 2023-03-21 | Stop reason: SDUPTHER

## 2020-08-05 RX ORDER — CLOTRIMAZOLE AND BETAMETHASONE DIPROPIONATE 10; .64 MG/G; MG/G
CREAM TOPICAL 2 TIMES DAILY
Qty: 45 G | Refills: 3 | Status: SHIPPED | OUTPATIENT
Start: 2020-08-05 | End: 2021-10-25 | Stop reason: SDUPTHER

## 2020-08-05 NOTE — PROGRESS NOTES
"INTERNAL MEDICINE ANNUAL VISIT NOTE      CHIEF COMPLAINT     ANNUAL     HPI     Jessee Dubose is a 74 y.o. C male who presents for annual.    HLD - atorvastatin 10mg daily.     Elevated PSA.   Saw urology 2018. Sometimes a little foamy in the urine. No hesitancy, urgency or frequency. No dysuria or hematuria.      H/o colitis in 2015. Mesenteric US w/ stenosis. Seen Dr. VAZQUEZ in 2015. Asymptomatic. No issues w/ abdominal pain. No bloody stools. Does have constipation - taking a little bit of mineral oil. No nausea/vomiting. Rec statin and asa for atherosclerosis.    Fatty liver. Cholelithiasis.   Used be quite an alcohol drinker but hasn't been drinking for a while.      Lives w/ wife. No assistance w/ ADLs. Does not drive. Wife drives him around. Hasn't been exercising much. watches TV most of the time and reads.     CT A/P 2015 "The abdominal aorta is normal in course and caliber demonstrate moderate calcific atherosclerosis which extends into its branch vessels."    Colon polyps - Cscope 8/10/16; repeat in 5 yrs.        Past Medical History:  Past Medical History:   Diagnosis Date    Allergy     Cataract     Diverticulosis     Hyperlipidemia     Joint pain     SMA stenosis     Steatosis of liver        Past Surgical History:  Past Surgical History:   Procedure Laterality Date    APPENDECTOMY      CATARACT EXTRACTION  11/4/13    right & left eye    CATARACT EXTRACTION W/  INTRAOCULAR LENS IMPLANT  11/18/13    Left Eye/ Dr Gupta    COLONOSCOPY N/A 8/10/2016    Procedure: COLONOSCOPY;  Surgeon: Adolfo Moore MD;  Location: [address]);  Service: Endoscopy;  Laterality: N/A;  Patient has a history of Colon Polyps.       Allergies:  Review of patient's allergies indicates:   Allergen Reactions    Hay fever and allergy relief Other (See Comments)     Runny nose, sinus congestion.       Home Medications:  Prior to Admission medications    Medication Sig Start Date End Date Taking? Authorizing " Provider   aspirin (ECOTRIN) 81 MG EC tablet Take 1 tablet (81 mg total) by mouth once daily. 2/25/15 3/13/19  Moi Salazar MD   atorvastatin (LIPITOR) 10 MG tablet Take 1 tablet (10 mg total) by mouth once daily. 7/22/19   Elizabeth Bunch MD   clotrimazole-betamethasone 1-0.05% (LOTRISONE) cream Apply topically 2 (two) times daily. 3/13/19   Elizabeth Bunch MD   co-enzyme Q-10 30 mg capsule Take 30 mg by mouth 3 (three) times daily.    Historical Provider, MD   fish oil-omega-3 fatty acids 300-1,000 mg capsule Take by mouth once daily.    Historical Provider, MD   olopatadine (PATANOL) 0.1 % ophthalmic solution Place 1 drop into both eyes 2 (two) times daily. 10/1/12 3/13/19  Rodrigo Montalvo, OD       Family History:  Family History   Problem Relation Age of Onset    Heart disease Father 66        MI    No Known Problems Brother     Cataracts Maternal Grandmother     Diabetes Maternal Grandmother     Cancer Maternal Grandmother     Diabetes Paternal Grandmother     Glaucoma Neg Hx     Blindness Neg Hx     Melanoma Neg Hx        Social History:  Social History     Tobacco Use    Smoking status: Never Smoker   Substance Use Topics    Alcohol use: Yes     Comment: 1 beer every few months    Drug use: No       Review of Systems:  Review of Systems   Constitutional: Negative for chills and fever.   HENT: Positive for postnasal drip. Negative for congestion and rhinorrhea.    Respiratory: Negative for cough, shortness of breath and wheezing.    Cardiovascular: Negative for chest pain, palpitations and leg swelling.   Gastrointestinal: Positive for constipation. Negative for abdominal pain, blood in stool, diarrhea, nausea and vomiting.   Genitourinary: Negative for dysuria and frequency.   Musculoskeletal: Negative for arthralgias, joint swelling and myalgias.   Skin: Negative for rash.   Neurological: Negative for dizziness, weakness, light-headedness and headaches.   Psychiatric/Behavioral: Negative for  "dysphoric mood and sleep disturbance. The patient is not nervous/anxious.        Health Maintainence:   Pt will ask about shingrix at local pharmacy.     PHYSICAL EXAM     /80 (BP Location: Left arm, Patient Position: Sitting)   Pulse 79   Temp 98.1 °F (36.7 °C) (Oral)   Resp 16   Ht 5' 9" (1.753 m)   Wt 76.7 kg (169 lb)   SpO2 97%   BMI 24.96 kg/m²     GEN - A+OX4, NAD   HEENT - PERRL, EOMI, OP clear. MMM. TM normal.   Neck - No thyromegaly or cervical LAD. No thyroid masses felt.  CV - RRR, no m/r   Chest - CTAB, no wheezing or rhonchi  Abd - S/NT/ND/+BS.   Ext - 2+BDP and radial pulses. No LE edema.   Neuro - PERRL, EOMI, no nystagmus, eyebrow raise, facial sensation, hearing, m of mastication, smile, palatal raise, shoulder shrug, tongue protrusion symmetric and intact. 5/5 BUE and BLE strength. Sensation to light touch intact throughout. 2+ DTRs. Normal gait.   MSK - No spinal tenderness to palpation. Normal gait.   Skin - No rash. Freckles on the forearms.    LABS     Previous labs reviewed.    ASSESSMENT/PLAN     Jessee Dubose is a 74 y.o. male with  Diagnoses and all orders for this visit:    Annual physical exam  -     Lipid Panel; Future; Expected date: 08/05/2020  -     CBC auto differential; Future; Expected date: 08/05/2020  -     Comprehensive metabolic panel; Future; Expected date: 08/05/2020  -     Hemoglobin A1C; Future; Expected date: 08/05/2020  -     Vitamin D; Future; Expected date: 08/05/2020  -     TSH; Future; Expected date: 08/05/2020    Hyperlipidemia, unspecified hyperlipidemia type  -     aspirin (ECOTRIN) 81 MG EC tablet; Take 1 tablet (81 mg total) by mouth once daily.  -     Lipid Panel; Future; Expected date: 08/05/2020  -     Comprehensive metabolic panel; Future; Expected date: 08/05/2020  -     atorvastatin (LIPITOR) 40 MG tablet; Take 1 tablet (40 mg total) by mouth once daily.    SMA stenosis - inc atorva from 10 to 40mg qd. Goal LDL<70.  -     aspirin (ECOTRIN) " 81 MG EC tablet; Take 1 tablet (81 mg total) by mouth once daily.  -     Vascular Lab () US Mesenteric Arterial; Future  -     Lipid Panel; Future; Expected date: 08/05/2020  -     Comprehensive metabolic panel; Future; Expected date: 08/05/2020  -     atorvastatin (LIPITOR) 40 MG tablet; Take 1 tablet (40 mg total) by mouth once daily.    Aortic atherosclerosis  -     aspirin (ECOTRIN) 81 MG EC tablet; Take 1 tablet (81 mg total) by mouth once daily.  -     Lipid Panel; Future; Expected date: 08/05/2020  -     Comprehensive metabolic panel; Future; Expected date: 08/05/2020  -     atorvastatin (LIPITOR) 40 MG tablet; Take 1 tablet (40 mg total) by mouth once daily.    Elevated prostate specific antigen (PSA)  -     PROSTATE SPECIFIC ANTIGEN, DIAGNOSTIC; Future; Expected date: 08/05/2020  -     Ambulatory referral/consult to Urology; Future; Expected date: 08/12/2020    Polyp of colon, unspecified part of colon, unspecified type - repeat cscope in 8/2021.    Antibody response exam  -     COVID-19 (SARS CoV-2) IgG Antibody; Future; Expected date: 08/05/2020    Dermatitis  -     clotrimazole-betamethasone 1-0.05% (LOTRISONE) cream; Apply topically 2 (two) times daily.    Skin cancer screening  -     Ambulatory referral/consult to Dermatology; Future; Expected date: 08/12/2020    Pt will ask about shingrix at local pharmacy.   RTC in 12 months, sooner if needed and depending on labs.    Elizabeth Bunch MD  Department of Internal Medicine - [facility]  6:53 AM

## 2020-08-05 NOTE — PATIENT INSTRUCTIONS
Labs today.   Shingrix (new shingles vaccine) at the pharmacy  Schedule mesenteric ultrasound to look at blood vessels around the stomach.   Schedule follow up with urologist.

## 2020-08-06 ENCOUNTER — TELEPHONE (OUTPATIENT)
Dept: INTERNAL MEDICINE | Facility: CLINIC | Age: 75
End: 2020-08-06

## 2020-08-06 NOTE — TELEPHONE ENCOUNTER
Please call to let patient know that COVID antibody is negative.  Prostate specific antigen is still elevated and is higher than what it was a year ago.  Keep follow-up appointment with urology as scheduled.  Thyroid level, blood count, liver and kidney functions are normal.  Platelets are very mildly elevated but not worrisome. No diabetes.  Cholesterol looks good. Cont atorvastatin 10mg daily.     Your vitamin D levels are low. Please start taking over the counter vitamin D 1000 IU daily.

## 2020-08-07 ENCOUNTER — TELEPHONE (OUTPATIENT)
Dept: INTERNAL MEDICINE | Facility: CLINIC | Age: 75
End: 2020-08-07

## 2020-08-07 NOTE — TELEPHONE ENCOUNTER
Pt informed of all results and to start taking vitamin d 1,000 units daily. Pt verbally understood. Also informed pt to keep his appt with Urology as scheduled on September 28th.

## 2020-08-07 NOTE — TELEPHONE ENCOUNTER
----- Message from Starr Sandoval sent at 8/7/2020 10:31 AM CDT -----  Contact: Jessee (self)  [phone]  Patient is returning a phone call.  Who left a message for the patient: Dr. Bunch office  Does patient know what this is regarding:  No  Comments:

## 2020-09-23 ENCOUNTER — PATIENT OUTREACH (OUTPATIENT)
Dept: ADMINISTRATIVE | Facility: OTHER | Age: 75
End: 2020-09-23

## 2020-09-23 NOTE — PROGRESS NOTES
LINKS immunization registry updated  Care Everywhere updated  Health Maintenance updated  Chart reviewed for overdue Proactive [facility]) health maintenance testing (CRS, Breast Ca, Diabetic Eye Exam)   Orders entered:N/A

## 2020-09-24 ENCOUNTER — HOSPITAL ENCOUNTER (OUTPATIENT)
Dept: VASCULAR SURGERY | Facility: CLINIC | Age: 75
Discharge: HOME | End: 2020-09-24
Attending: INTERNAL MEDICINE
Payer: COMMERCIAL

## 2020-09-24 DIAGNOSIS — K55.1 SMA STENOSIS: ICD-10-CM

## 2020-09-24 PROCEDURE — 93975 VASCULAR STUDY: CPT | Mod: S$GLB,,, | Performed by: SURGERY

## 2020-09-24 PROCEDURE — 93975 PR DUPLEX ABD/PEL VASC STUDY,COMPLETE: ICD-10-PCS | Mod: S$GLB,,, | Performed by: SURGERY

## 2020-09-28 ENCOUNTER — OFFICE VISIT (OUTPATIENT)
Dept: UROLOGY | Facility: CLINIC | Age: 75
End: 2020-09-28
Payer: COMMERCIAL

## 2020-09-28 ENCOUNTER — TELEPHONE (OUTPATIENT)
Dept: INTERNAL MEDICINE | Facility: CLINIC | Age: 75
End: 2020-09-28

## 2020-09-28 VITALS
SYSTOLIC BLOOD PRESSURE: 155 MMHG | HEART RATE: 76 BPM | BODY MASS INDEX: 24.75 KG/M2 | WEIGHT: 167.13 LBS | HEIGHT: 69 IN | DIASTOLIC BLOOD PRESSURE: 76 MMHG

## 2020-09-28 DIAGNOSIS — N13.8 BPH WITH URINARY OBSTRUCTION: ICD-10-CM

## 2020-09-28 DIAGNOSIS — E78.5 HYPERLIPIDEMIA, UNSPECIFIED HYPERLIPIDEMIA TYPE: ICD-10-CM

## 2020-09-28 DIAGNOSIS — N52.01 ERECTILE DYSFUNCTION DUE TO ARTERIAL INSUFFICIENCY: ICD-10-CM

## 2020-09-28 DIAGNOSIS — R97.20 ELEVATED PROSTATE SPECIFIC ANTIGEN (PSA): Primary | ICD-10-CM

## 2020-09-28 DIAGNOSIS — K55.9 MESENTERIC ISCHEMIA: Primary | ICD-10-CM

## 2020-09-28 DIAGNOSIS — N40.1 BPH WITH URINARY OBSTRUCTION: ICD-10-CM

## 2020-09-28 DIAGNOSIS — I10 HYPERTENSION, UNSPECIFIED TYPE: ICD-10-CM

## 2020-09-28 PROCEDURE — 99214 PR OFFICE/OUTPT VISIT, EST, LEVL IV, 30-39 MIN: ICD-10-PCS | Mod: S$GLB,,, | Performed by: UROLOGY

## 2020-09-28 PROCEDURE — 1126F AMNT PAIN NOTED NONE PRSNT: CPT | Mod: S$GLB,,, | Performed by: UROLOGY

## 2020-09-28 PROCEDURE — 1159F MED LIST DOCD IN RCRD: CPT | Mod: S$GLB,,, | Performed by: UROLOGY

## 2020-09-28 PROCEDURE — 3078F DIAST BP <80 MM HG: CPT | Mod: CPTII,S$GLB,, | Performed by: UROLOGY

## 2020-09-28 PROCEDURE — 1159F PR MEDICATION LIST DOCUMENTED IN MEDICAL RECORD: ICD-10-PCS | Mod: S$GLB,,, | Performed by: UROLOGY

## 2020-09-28 PROCEDURE — 99214 OFFICE O/P EST MOD 30 MIN: CPT | Mod: S$GLB,,, | Performed by: UROLOGY

## 2020-09-28 PROCEDURE — 1101F PR PT FALLS ASSESS DOC 0-1 FALLS W/OUT INJ PAST YR: ICD-10-PCS | Mod: CPTII,S$GLB,, | Performed by: UROLOGY

## 2020-09-28 PROCEDURE — 99999 PR PBB SHADOW E&M-EST. PATIENT-LVL IV: CPT | Mod: PBBFAC,,, | Performed by: UROLOGY

## 2020-09-28 PROCEDURE — 3077F PR MOST RECENT SYSTOLIC BLOOD PRESSURE >= 140 MM HG: ICD-10-PCS | Mod: CPTII,S$GLB,, | Performed by: UROLOGY

## 2020-09-28 PROCEDURE — 3078F PR MOST RECENT DIASTOLIC BLOOD PRESSURE < 80 MM HG: ICD-10-PCS | Mod: CPTII,S$GLB,, | Performed by: UROLOGY

## 2020-09-28 PROCEDURE — 99999 PR PBB SHADOW E&M-EST. PATIENT-LVL IV: ICD-10-PCS | Mod: PBBFAC,,, | Performed by: UROLOGY

## 2020-09-28 PROCEDURE — 3008F PR BODY MASS INDEX (BMI) DOCUMENTED: ICD-10-PCS | Mod: CPTII,S$GLB,, | Performed by: UROLOGY

## 2020-09-28 PROCEDURE — 3077F SYST BP >= 140 MM HG: CPT | Mod: CPTII,S$GLB,, | Performed by: UROLOGY

## 2020-09-28 PROCEDURE — 1101F PT FALLS ASSESS-DOCD LE1/YR: CPT | Mod: CPTII,S$GLB,, | Performed by: UROLOGY

## 2020-09-28 PROCEDURE — 1126F PR PAIN SEVERITY QUANTIFIED, NO PAIN PRESENT: ICD-10-PCS | Mod: S$GLB,,, | Performed by: UROLOGY

## 2020-09-28 PROCEDURE — 3008F BODY MASS INDEX DOCD: CPT | Mod: CPTII,S$GLB,, | Performed by: UROLOGY

## 2020-09-28 NOTE — TELEPHONE ENCOUNTER
Please call to let pt know that the ultrasound shows mesenteric ischemia as previously diagnosed. Even though he's not have any abdominal symptoms, I'd like him to follow up with vascular medicine to make sure it does nto worsen. Cont aspirin  And atorvastatin 40mg qd.

## 2020-09-28 NOTE — PROGRESS NOTES
CHIEF COMPLAINT:    Mr. Dubose is a 74 y.o. male presenting for a consultation at the request of Dr. Bunch. Patient presents with an elevated PSA.    PRESENTING ILLNESS:    Jessee Dubose is a 74 y.o. male with a history of an elevated PSA.  He's s/p a negative TRUS bx 5/2016.  PSA at that time was 7.5.      He has LUTS.  He has nocturia x 2 and is pleased with how he voids.  No hematuria.  No dysuria.      He has ED.  However, he's not sexually active with his wife, so it is not bothersome.      REVIEW OF SYSTEMS:    Jessee Dubose denies headache, blurred vision, fever, nausea, vomiting, chills, abdominal pain, chest pain, sore throat, bleeding per rectum, cough, SOB, recent loss of consciousness, recent mental status changes, seizures, dizziness, or upper or lower extremity weakness.    CODY  1. 3  2. 0  3. 0  4. 0  5. 0      PATIENT HISTORY:    Past Medical History:   Diagnosis Date    Allergy     Cataract     Diverticulosis     Hyperlipidemia     Joint pain     SMA stenosis     Steatosis of liver        Past Surgical History:   Procedure Laterality Date    APPENDECTOMY      CATARACT EXTRACTION  11/4/13    right & left eye    CATARACT EXTRACTION W/  INTRAOCULAR LENS IMPLANT  11/18/13    Left Eye/ Dr Gupta    COLONOSCOPY N/A 8/10/2016    Procedure: COLONOSCOPY;  Surgeon: Adolfo Moore MD;  Location: [facility] ([address] [place] [address]);  Service: Endoscopy;  Laterality: N/A;  Patient has a history of Colon Polyps.       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   Social Needs    Financial resource strain: Not on file    Food insecurity     Worry: Not on file     Inability: Not on file    Transportation needs     Medical: Not on file     Non-medical: Not on file   Tobacco Use    Smoking status: Never Smoker   Substance and Sexual Activity    Alcohol use: Yes     Comment: 1 beer every few months    Drug use: No    Sexual activity: Not on file   Lifestyle    Physical activity     Days per week: Not on file     Minutes per session: Not on file    Stress: Not on file   Relationships    Social connections     Talks on phone: Not on file     Gets together: Not on file     Attends Mandaeism service: Not on file     Active member of club or organization: Not on file     Attends meetings of clubs or organizations: Not on file     Relationship status: Not on file   Other Topics Concern    Not on file   Social History Narrative    Not on file       Allergies:  Hay fever and allergy relief    Medications:    Current Outpatient Medications:     aspirin (ECOTRIN) 81 MG EC tablet, Take 1 tablet (81 mg total) by mouth once daily., Disp: 360 tablet, Rfl: 0    atorvastatin (LIPITOR) 40 MG tablet, Take 1 tablet (40 mg total) by mouth once daily., Disp: 90 tablet, Rfl: 3    clotrimazole-betamethasone 1-0.05% (LOTRISONE) cream, Apply topically 2 (two) times daily., Disp: 45 g, Rfl: 3    co-enzyme Q-10 30 mg capsule, Take 30 mg by mouth 3 (three) times daily., Disp: , Rfl:     fish oil-omega-3 fatty acids 300-1,000 mg capsule, Take by mouth once daily., Disp: , Rfl:     olopatadine (PATANOL) 0.1 % ophthalmic solution, Place 1 drop into both eyes 2 (two) times daily., Disp: 5 mL, Rfl: 6    PHYSICAL EXAMINATION:    The patient generally appears in good health, is appropriately interactive, and is in no apparent distress.     Eyes: anicteric sclerae, moist  conjunctivae; no lid-lag; PERRLA     HENT: Atraumatic; oropharynx clear with moist mucous membranes and no mucosal ulcerations;normal hard and soft palate.  No evidence of lymphadenopathy.    Neck: Trachea midline.  No thyromegaly.    Musculoskeletal: No abnormal gait.    Skin: No lesions.    Mental: Cooperative with normal affect.  Is oriented to time, place, and person.    Neuro: Grossly intact.    Chest: Normal inspiratory effort.   No accessory muscles.  No audible wheezes.  Respirations symmetric on inspiration and expiration.    Heart: Regular rhythm.      Abdomen:  Soft, non-tender. No masses or organomegaly. Bladder is not palpable. No evidence of flank discomfort. No evidence of inguinal hernia.    Genitourinary: The penis has no evidence of plaques or induration. The urethral meatus is normal. The testes, epididymides, and cord structures are normal in size and contour bilaterally. The scrotum is normal in size and contour.    Normal anal sphincter tone. No rectal mass.    The prostate is 50 g. Normal landmarks. Lateral sulci. Median furrow intact.  No nodularity or induration. Seminal vesicles are normal.    Extremities: No clubbing, cyanosis, or edema      LABS:      Lab Results   Component Value Date    PSA 4.6 (H) 03/13/2019    PSA 4.6 (H) 05/22/2018    PSA 7.3 (H) 03/03/2016    PSADIAG 6.2 (H) 08/05/2020    PSADIAG 4.1 (H) 03/23/2017    PSATOTAL 7.5 (H) 03/23/2016    PSATOTAL 4.8 (H) 03/11/2016    PSAFREE 1.48 03/23/2016    PSAFREE 0.90 03/11/2016    PSAFREEPCT 19.73 03/23/2016    PSAFREEPCT 18.75 03/11/2016       IMPRESSION:    Encounter Diagnoses   Name Primary?    Elevated prostate specific antigen (PSA) Yes    BPH with urinary obstruction     Erectile dysfunction due to arterial insufficiency     Hypertension, unspecified type     Hyperlipidemia, unspecified hyperlipidemia type      HTN, controlled  Hyperlipidemia, controlled    PLAN:    1. Went over his PSA.    2. Will observe his LUTS as  they don't bother him.  3. Will observe his ED as it's not bothersome.  4. RTC 6 months with a PSA to see an CHERI.    Copy to:

## 2020-09-28 NOTE — LETTER
September 28, 2020      Elizabeth Bunch MD  [address]  [facility] [address]           [facility]  [address]  [facility] [address]  Phone: [phone]          Patient: Jessee Dubose   MR Number: [id number]   YOB: 1945   Date of Visit: 9/28/2020       Dear Dr. Elizabeth Bunch:    Thank you for referring Jessee Dubose to me for evaluation. Attached you will find relevant portions of my assessment and plan of care.    If you have questions, please do not hesitate to call me. I look forward to following Jessee Dubose along with you.    Sincerely,    Shree Grier MD    Enclosure  CC:  No Recipients    If you would like to receive this communication electronically, please contact [email or web address] or [phone] to request more information on [employer] Link access.    For providers and/or their staff who would like to refer a patient to [facility], please contact us through our one-stop-shop provider referral line, [facility], at [phone].    If you feel you have received this communication in error or would no longer like to receive these types of communications, please e-mail [email or web address]

## 2020-09-29 NOTE — TELEPHONE ENCOUNTER
Pt informed of ultrasound results and to continue the aspirin atorvastatin 40mg. Pt verbally understood. Vascular medicine appt scheduled for October 8th.

## 2020-10-08 ENCOUNTER — INITIAL CONSULT (OUTPATIENT)
Dept: VASCULAR SURGERY | Facility: CLINIC | Age: 75
End: 2020-10-08
Attending: SURGERY
Payer: COMMERCIAL

## 2020-10-08 VITALS
SYSTOLIC BLOOD PRESSURE: 157 MMHG | BODY MASS INDEX: 24.62 KG/M2 | HEIGHT: 69 IN | HEART RATE: 91 BPM | WEIGHT: 166.25 LBS | TEMPERATURE: 99 F | DIASTOLIC BLOOD PRESSURE: 83 MMHG

## 2020-10-08 DIAGNOSIS — K55.9 MESENTERIC ISCHEMIA: ICD-10-CM

## 2020-10-08 DIAGNOSIS — K55.1 SUPERIOR MESENTERIC ARTERY STENOSIS: Primary | ICD-10-CM

## 2020-10-08 PROCEDURE — 99244 PR OFFICE CONSULTATION,LEVEL IV: ICD-10-PCS | Mod: S$GLB,,, | Performed by: SURGERY

## 2020-10-08 PROCEDURE — 99244 OFF/OP CNSLTJ NEW/EST MOD 40: CPT | Mod: S$GLB,,, | Performed by: SURGERY

## 2020-10-08 PROCEDURE — 99999 PR PBB SHADOW E&M-EST. PATIENT-LVL III: CPT | Mod: PBBFAC,,, | Performed by: SURGERY

## 2020-10-08 PROCEDURE — 99999 PR PBB SHADOW E&M-EST. PATIENT-LVL III: ICD-10-PCS | Mod: PBBFAC,,, | Performed by: SURGERY

## 2020-10-08 NOTE — PROGRESS NOTES
Jessee MONROY Gracy  10/08/2020    HPI:  Patient is a 74 y.o. male with a h/o HLD, diverticulosis who is here today for evaluation of SMA stenosis. US shows a flow rate of 327 cm/s and a Ratio of 3.0. He is asymptomatic. Denies abdominal pain, diarrhea, nausea, vomiting, bloody stools. He had initially had a CT and US evaluation for mesenteric ischemia in 2015 when he had an episode of diverticulosis. Since then, has had no issues.     Denies MI/stroke  Tobacco use: Never  Stopped excessive drinking in 2015, now drinks socially  Does not exercise but is able to perform household tasks without feeling short of breath    Past Medical History:   Diagnosis Date    Allergy     Cataract     Diverticulosis     Hyperlipidemia     Joint pain     SMA stenosis     Steatosis of liver      Past Surgical History:   Procedure Laterality Date    APPENDECTOMY      CATARACT EXTRACTION  11/4/13    right & left eye    CATARACT EXTRACTION W/  INTRAOCULAR LENS IMPLANT  11/18/13    Left Eye/ Dr Gupta    COLONOSCOPY N/A 8/10/2016    Procedure: COLONOSCOPY;  Surgeon: Adolfo Moore MD;  Location: [facility] ([address] [place] [address]);  Service: Endoscopy;  Laterality: N/A;  Patient has a history of Colon Polyps.     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   Social Needs    Financial resource strain: Not on file    Food insecurity     Worry: Not on file     Inability: Not on file     Transportation needs     Medical: Not on file     Non-medical: Not on file   Tobacco Use    Smoking status: Never Smoker   Substance and Sexual Activity    Alcohol use: Yes     Comment: 1 beer every few months    Drug use: No    Sexual activity: Not on file   Lifestyle    Physical activity     Days per week: Not on file     Minutes per session: Not on file    Stress: Not on file   Relationships    Social connections     Talks on phone: Not on file     Gets together: Not on file     Attends Baptism service: Not on file     Active member of club or organization: Not on file     Attends meetings of clubs or organizations: Not on file     Relationship status: Not on file   Other Topics Concern    Not on file   Social History Narrative    Not on file       Current Outpatient Medications:     aspirin (ECOTRIN) 81 MG EC tablet, Take 1 tablet (81 mg total) by mouth once daily., Disp: 360 tablet, Rfl: 0    atorvastatin (LIPITOR) 40 MG tablet, Take 1 tablet (40 mg total) by mouth once daily., Disp: 90 tablet, Rfl: 3    clotrimazole-betamethasone 1-0.05% (LOTRISONE) cream, Apply topically 2 (two) times daily., Disp: 45 g, Rfl: 3    co-enzyme Q-10 30 mg capsule, Take 30 mg by mouth 3 (three) times daily., Disp: , Rfl:     fish oil-omega-3 fatty acids 300-1,000 mg capsule, Take by mouth once daily., Disp: , Rfl:     olopatadine (PATANOL) 0.1 % ophthalmic solution, Place 1 drop into both eyes 2 (two) times daily., Disp: 5 mL, Rfl: 6    REVIEW OF SYSTEMS:  General: negative; ENT: negative; Allergy and Immunology: negative; Hematological and Lymphatic: Negative; Endocrine: negative; Respiratory: no cough, shortness of breath, or wheezing; Cardiovascular: no chest pain or dyspnea on exertion; Gastrointestinal: no abdominal pain/back, change in bowel habits, or bloody stools; Genito-Urinary: no dysuria, trouble voiding, or hematuria; Musculoskeletal: negative  Neurological: no TIA or stroke symptoms; Psychiatric:  no nervousness, anxiety or depression.    PHYSICAL EXAM:   Right Arm BP - Sittin/83 (10/08/20 1009)  Left Arm BP - Sittin/88 (10/08/20 1009)  Pulse: 91  Temp: 99.4 °F (37.4 °C)      General appearance:  Alert, well-appearing, and in no distress.  Oriented to person, place, and time   Neurological: Normal speech, no focal findings noted; CN II - XII grossly intact           Musculoskeletal: Digits/nail without cyanosis/clubbing.  Normal muscle strength/tone.                 Neck: Supple, no significant adenopathy; thyroid is not enlarged                Chest:  Clear to auscultation, no wheezes, rales or rhonchi, symmetric air entry      No use of accessory muscles             Cardiac: Normal rate and regular rhythm, S1 and S2 normal; PMI non-displaced          Abdomen: Soft, nontender, nondistended, no masses or organomegaly      No rebound tenderness noted; bowel sounds normal      No groin adenopathy      Extremities:   2+ femoral pulses bilaterally      pedal pulses palpable.      No pre-tibial edema      No ulcerations    LAB RESULTS:  Lab Results   Component Value Date    K 4.7 2020    K 4.4 2019    K 4.4 2018    CREATININE 1.2 2020    CREATININE 1.1 2019    CREATININE 1.0 2018     Lab Results   Component Value Date    WBC 9.77 2020    WBC 5.82 2019    WBC 7.03 2018    HCT 48.2 2020    HCT 44.9 2019    HCT 43.5 2018     (H) 2020     2019     2018     Lab Results   Component Value Date    HGBA1C 5.3 2020    HGBA1C 5.1 2018    HGBA1C 5.2 2017     IMAGING:  Indication   ========     -SMA stenosis     Aorta   =====     Aorta mid PS   109 cm/s   Aorta mid  ED          23 cm/s     Mesenteric Arteries   ===============     Celiac axis prox PSV   152 cm/s   Ratio (Celiac axis prox PSV / Aorta abd. PSV)  1.4   SMA prox PSV   327 cm/s   Ratio (SMA prox PSV / Aorta abd. PSV)  3.0    SMA mid PSV    203 cm/s   Ratio (SMA mid PSV / Aorta abd. PSV)   1.9   SMA distal  cm/s   Ratio (SMA distal PSV / Aorta abd. PSV)    1.2   ALKA prox PSV   140 cm/s   Ratio (ALKA prox PSV / Aorta abd. PSV)  1.3     Impression   =========     Duplex imaging reveals an accelerated velocity of 327 cm/s noted within the Proximal SMA with possible calcific plaque visualized.   This findings is suggestive of a focal stenosis. The Aorta, celiac and inferior mesenteric arteries appear to be widely patent without   stenosis.     IMP/PLAN:  74 y.o. male with h/o HLD, diverticulosis who is here today for evaluation of SMA stenosis. SMA  cm/s with a ratio of 3.0.  Denies weight loss, food fear, pos-prandial pain.      - continue aspirin and statin  - Low suspicion for mesenteric ischemia given lack of symptoms  - CT shows widely patent SMA and celiac arteries  - atherosclerosis of SMA can give overestimate of SMA flow velocity   - return to clinic 12 mo with mesenteric duplex      STAFF ADDENDUM    I have reviewed the relevant data and the resident's assessment and agree with the findings and plan as outlined above.    Дмитрий Zarate MD  Vascular & Endovascular Surgery

## 2020-10-08 NOTE — LETTER
October 20, 2020      Elizabeth Bunch MD  [address]  [facility] [address]           [facility] 5th Fl  [address]  [facility] [address]  Phone: [phone]  Fax: [phone]          Patient: Jessee Dubose   MR Number: [id number]   YOB: 1945   Date of Visit: 10/8/2020       Dear Dr. Elizabeth Bunch:    Thank you for referring Jessee Dubose to me for evaluation. Attached you will find relevant portions of my assessment and plan of care.    If you have questions, please do not hesitate to call me. I look forward to following Jessee Dubose along with you.    Sincerely,    Дмитрий Zarate MD    Enclosure  CC:  No Recipients    If you would like to receive this communication electronically, please contact [email or web address] or [phone] to request more information on [employer] Link access.    For providers and/or their staff who would like to refer a patient to [facility], please contact us through our one-stop-shop provider referral line, [facility], at [phone].    If you feel you have received this communication in error or would no longer like to receive these types of communications, please e-mail [email or web address]

## 2020-11-04 ENCOUNTER — IMMUNIZATION (OUTPATIENT)
Dept: PHARMACY | Facility: CLINIC | Age: 75
End: 2020-11-04
Payer: COMMERCIAL

## 2021-08-06 DIAGNOSIS — K55.1 SUPERIOR MESENTERIC ARTERY STENOSIS: ICD-10-CM

## 2021-08-06 DIAGNOSIS — E78.5 HYPERLIPIDEMIA, UNSPECIFIED HYPERLIPIDEMIA TYPE: ICD-10-CM

## 2021-08-06 DIAGNOSIS — I70.0 AORTIC ATHEROSCLEROSIS: ICD-10-CM

## 2021-08-06 RX ORDER — ATORVASTATIN CALCIUM 40 MG/1
40 TABLET, FILM COATED ORAL DAILY
Qty: 90 TABLET | Refills: 1 | Status: SHIPPED | OUTPATIENT
Start: 2021-08-06 | End: 2022-02-04

## 2021-09-20 ENCOUNTER — TELEPHONE (OUTPATIENT)
Dept: INTERNAL MEDICINE | Facility: CLINIC | Age: 76
End: 2021-09-20

## 2021-09-27 ENCOUNTER — TELEPHONE (OUTPATIENT)
Dept: INTERNAL MEDICINE | Facility: CLINIC | Age: 76
End: 2021-09-27

## 2021-10-25 ENCOUNTER — OFFICE VISIT (OUTPATIENT)
Dept: INTERNAL MEDICINE | Facility: CLINIC | Age: 76
End: 2021-10-25
Payer: COMMERCIAL

## 2021-10-25 VITALS
DIASTOLIC BLOOD PRESSURE: 78 MMHG | BODY MASS INDEX: 23.25 KG/M2 | WEIGHT: 156.94 LBS | TEMPERATURE: 98 F | HEART RATE: 82 BPM | SYSTOLIC BLOOD PRESSURE: 130 MMHG | HEIGHT: 69 IN | OXYGEN SATURATION: 99 %

## 2021-10-25 DIAGNOSIS — K55.1 SUPERIOR MESENTERIC ARTERY STENOSIS: ICD-10-CM

## 2021-10-25 DIAGNOSIS — K63.5 POLYP OF COLON, UNSPECIFIED PART OF COLON, UNSPECIFIED TYPE: ICD-10-CM

## 2021-10-25 DIAGNOSIS — L30.9 DERMATITIS: ICD-10-CM

## 2021-10-25 DIAGNOSIS — E78.5 HYPERLIPIDEMIA, UNSPECIFIED HYPERLIPIDEMIA TYPE: ICD-10-CM

## 2021-10-25 DIAGNOSIS — I70.0 AORTIC ATHEROSCLEROSIS: ICD-10-CM

## 2021-10-25 DIAGNOSIS — E55.9 VITAMIN D DEFICIENCY: ICD-10-CM

## 2021-10-25 DIAGNOSIS — Z00.00 ANNUAL PHYSICAL EXAM: Primary | ICD-10-CM

## 2021-10-25 DIAGNOSIS — Z12.11 COLON CANCER SCREENING: ICD-10-CM

## 2021-10-25 DIAGNOSIS — D75.839 THROMBOCYTOSIS: ICD-10-CM

## 2021-10-25 DIAGNOSIS — K21.9 GASTROESOPHAGEAL REFLUX DISEASE, UNSPECIFIED WHETHER ESOPHAGITIS PRESENT: ICD-10-CM

## 2021-10-25 DIAGNOSIS — R97.20 ELEVATED PSA: ICD-10-CM

## 2021-10-25 PROCEDURE — 99999 PR PBB SHADOW E&M-EST. PATIENT-LVL IV: CPT | Mod: PBBFAC,,, | Performed by: INTERNAL MEDICINE

## 2021-10-25 PROCEDURE — 1159F MED LIST DOCD IN RCRD: CPT | Mod: CPTII,S$GLB,, | Performed by: INTERNAL MEDICINE

## 2021-10-25 PROCEDURE — 99999 PR PBB SHADOW E&M-EST. PATIENT-LVL IV: ICD-10-PCS | Mod: PBBFAC,,, | Performed by: INTERNAL MEDICINE

## 2021-10-25 PROCEDURE — 3008F PR BODY MASS INDEX (BMI) DOCUMENTED: ICD-10-PCS | Mod: CPTII,S$GLB,, | Performed by: INTERNAL MEDICINE

## 2021-10-25 PROCEDURE — 99397 PR PREVENTIVE VISIT,EST,65 & OVER: ICD-10-PCS | Mod: S$GLB,,, | Performed by: INTERNAL MEDICINE

## 2021-10-25 PROCEDURE — 1126F PR PAIN SEVERITY QUANTIFIED, NO PAIN PRESENT: ICD-10-PCS | Mod: CPTII,S$GLB,, | Performed by: INTERNAL MEDICINE

## 2021-10-25 PROCEDURE — 99397 PER PM REEVAL EST PAT 65+ YR: CPT | Mod: S$GLB,,, | Performed by: INTERNAL MEDICINE

## 2021-10-25 PROCEDURE — 3288F FALL RISK ASSESSMENT DOCD: CPT | Mod: CPTII,S$GLB,, | Performed by: INTERNAL MEDICINE

## 2021-10-25 PROCEDURE — 3078F DIAST BP <80 MM HG: CPT | Mod: CPTII,S$GLB,, | Performed by: INTERNAL MEDICINE

## 2021-10-25 PROCEDURE — 1160F RVW MEDS BY RX/DR IN RCRD: CPT | Mod: CPTII,S$GLB,, | Performed by: INTERNAL MEDICINE

## 2021-10-25 PROCEDURE — 1126F AMNT PAIN NOTED NONE PRSNT: CPT | Mod: CPTII,S$GLB,, | Performed by: INTERNAL MEDICINE

## 2021-10-25 PROCEDURE — 3078F PR MOST RECENT DIASTOLIC BLOOD PRESSURE < 80 MM HG: ICD-10-PCS | Mod: CPTII,S$GLB,, | Performed by: INTERNAL MEDICINE

## 2021-10-25 PROCEDURE — 3075F SYST BP GE 130 - 139MM HG: CPT | Mod: CPTII,S$GLB,, | Performed by: INTERNAL MEDICINE

## 2021-10-25 PROCEDURE — 1101F PT FALLS ASSESS-DOCD LE1/YR: CPT | Mod: CPTII,S$GLB,, | Performed by: INTERNAL MEDICINE

## 2021-10-25 PROCEDURE — 1160F PR REVIEW ALL MEDS BY PRESCRIBER/CLIN PHARMACIST DOCUMENTED: ICD-10-PCS | Mod: CPTII,S$GLB,, | Performed by: INTERNAL MEDICINE

## 2021-10-25 PROCEDURE — 3008F BODY MASS INDEX DOCD: CPT | Mod: CPTII,S$GLB,, | Performed by: INTERNAL MEDICINE

## 2021-10-25 PROCEDURE — 3288F PR FALLS RISK ASSESSMENT DOCUMENTED: ICD-10-PCS | Mod: CPTII,S$GLB,, | Performed by: INTERNAL MEDICINE

## 2021-10-25 PROCEDURE — 3075F PR MOST RECENT SYSTOLIC BLOOD PRESS GE 130-139MM HG: ICD-10-PCS | Mod: CPTII,S$GLB,, | Performed by: INTERNAL MEDICINE

## 2021-10-25 PROCEDURE — 1101F PR PT FALLS ASSESS DOC 0-1 FALLS W/OUT INJ PAST YR: ICD-10-PCS | Mod: CPTII,S$GLB,, | Performed by: INTERNAL MEDICINE

## 2021-10-25 PROCEDURE — 1159F PR MEDICATION LIST DOCUMENTED IN MEDICAL RECORD: ICD-10-PCS | Mod: CPTII,S$GLB,, | Performed by: INTERNAL MEDICINE

## 2021-10-25 RX ORDER — CLOTRIMAZOLE AND BETAMETHASONE DIPROPIONATE 10; .64 MG/G; MG/G
CREAM TOPICAL 2 TIMES DAILY
Qty: 45 G | Refills: 3 | Status: SHIPPED | OUTPATIENT
Start: 2021-10-25 | End: 2022-08-05 | Stop reason: SDUPTHER

## 2021-10-25 RX ORDER — OMEPRAZOLE 40 MG/1
40 CAPSULE, DELAYED RELEASE ORAL DAILY
Qty: 30 CAPSULE | Refills: 0 | Status: SHIPPED | OUTPATIENT
Start: 2021-10-25 | End: 2022-02-10

## 2021-10-26 ENCOUNTER — LAB VISIT (OUTPATIENT)
Dept: LAB | Facility: HOSPITAL | Age: 76
End: 2021-10-26
Attending: INTERNAL MEDICINE
Payer: COMMERCIAL

## 2021-10-26 DIAGNOSIS — K55.1 SUPERIOR MESENTERIC ARTERY STENOSIS: ICD-10-CM

## 2021-10-26 DIAGNOSIS — E55.9 VITAMIN D DEFICIENCY: ICD-10-CM

## 2021-10-26 DIAGNOSIS — D75.839 THROMBOCYTOSIS: ICD-10-CM

## 2021-10-26 DIAGNOSIS — R97.20 ELEVATED PSA: ICD-10-CM

## 2021-10-26 DIAGNOSIS — I70.0 AORTIC ATHEROSCLEROSIS: ICD-10-CM

## 2021-10-26 DIAGNOSIS — Z00.00 ANNUAL PHYSICAL EXAM: ICD-10-CM

## 2021-10-26 DIAGNOSIS — E78.5 HYPERLIPIDEMIA, UNSPECIFIED HYPERLIPIDEMIA TYPE: ICD-10-CM

## 2021-10-26 LAB
25(OH)D3+25(OH)D2 SERPL-MCNC: 33 NG/ML (ref 30–96)
ALBUMIN SERPL BCP-MCNC: 3.8 G/DL (ref 3.5–5.2)
ALP SERPL-CCNC: 78 U/L (ref 55–135)
ALT SERPL W/O P-5'-P-CCNC: 19 U/L (ref 10–44)
ANION GAP SERPL CALC-SCNC: 8 MMOL/L (ref 8–16)
AST SERPL-CCNC: 21 U/L (ref 10–40)
BASOPHILS # BLD AUTO: 0.05 K/UL (ref 0–0.2)
BASOPHILS NFR BLD: 0.7 % (ref 0–1.9)
BILIRUB SERPL-MCNC: 1 MG/DL (ref 0.1–1)
BUN SERPL-MCNC: 22 MG/DL (ref 8–23)
CALCIUM SERPL-MCNC: 9.9 MG/DL (ref 8.7–10.5)
CHLORIDE SERPL-SCNC: 104 MMOL/L (ref 95–110)
CHOLEST SERPL-MCNC: 106 MG/DL (ref 120–199)
CHOLEST/HDLC SERPL: 2.7 {RATIO} (ref 2–5)
CO2 SERPL-SCNC: 28 MMOL/L (ref 23–29)
COMPLEXED PSA SERPL-MCNC: 5.5 NG/ML (ref 0–4)
CREAT SERPL-MCNC: 1 MG/DL (ref 0.5–1.4)
DIFFERENTIAL METHOD: ABNORMAL
EOSINOPHIL # BLD AUTO: 0.4 K/UL (ref 0–0.5)
EOSINOPHIL NFR BLD: 6.1 % (ref 0–8)
ERYTHROCYTE [DISTWIDTH] IN BLOOD BY AUTOMATED COUNT: 13.2 % (ref 11.5–14.5)
EST. GFR  (AFRICAN AMERICAN): >60 ML/MIN/1.73 M^2
EST. GFR  (NON AFRICAN AMERICAN): >60 ML/MIN/1.73 M^2
GLUCOSE SERPL-MCNC: 110 MG/DL (ref 70–110)
HCT VFR BLD AUTO: 43.4 % (ref 40–54)
HDLC SERPL-MCNC: 39 MG/DL (ref 40–75)
HDLC SERPL: 36.8 % (ref 20–50)
HGB BLD-MCNC: 14.9 G/DL (ref 14–18)
IMM GRANULOCYTES # BLD AUTO: 0.02 K/UL (ref 0–0.04)
IMM GRANULOCYTES NFR BLD AUTO: 0.3 % (ref 0–0.5)
LDLC SERPL CALC-MCNC: 53.8 MG/DL (ref 63–159)
LYMPHOCYTES # BLD AUTO: 1.3 K/UL (ref 1–4.8)
LYMPHOCYTES NFR BLD: 19.3 % (ref 18–48)
MCH RBC QN AUTO: 31.6 PG (ref 27–31)
MCHC RBC AUTO-ENTMCNC: 34.3 G/DL (ref 32–36)
MCV RBC AUTO: 92 FL (ref 82–98)
MONOCYTES # BLD AUTO: 0.8 K/UL (ref 0.3–1)
MONOCYTES NFR BLD: 11.3 % (ref 4–15)
NEUTROPHILS # BLD AUTO: 4.2 K/UL (ref 1.8–7.7)
NEUTROPHILS NFR BLD: 62.3 % (ref 38–73)
NONHDLC SERPL-MCNC: 67 MG/DL
NRBC BLD-RTO: 0 /100 WBC
PLATELET # BLD AUTO: 296 K/UL (ref 150–450)
PMV BLD AUTO: 10.1 FL (ref 9.2–12.9)
POTASSIUM SERPL-SCNC: 4.5 MMOL/L (ref 3.5–5.1)
PROT SERPL-MCNC: 6.6 G/DL (ref 6–8.4)
RBC # BLD AUTO: 4.72 M/UL (ref 4.6–6.2)
SODIUM SERPL-SCNC: 140 MMOL/L (ref 136–145)
TRIGL SERPL-MCNC: 66 MG/DL (ref 30–150)
WBC # BLD AUTO: 6.72 K/UL (ref 3.9–12.7)

## 2021-10-26 PROCEDURE — 82306 VITAMIN D 25 HYDROXY: CPT | Performed by: INTERNAL MEDICINE

## 2021-10-26 PROCEDURE — 36415 COLL VENOUS BLD VENIPUNCTURE: CPT | Performed by: INTERNAL MEDICINE

## 2021-10-26 PROCEDURE — 80061 LIPID PANEL: CPT | Performed by: INTERNAL MEDICINE

## 2021-10-26 PROCEDURE — 85025 COMPLETE CBC W/AUTO DIFF WBC: CPT | Performed by: INTERNAL MEDICINE

## 2021-10-26 PROCEDURE — 80053 COMPREHEN METABOLIC PANEL: CPT | Performed by: INTERNAL MEDICINE

## 2021-10-26 PROCEDURE — 84153 ASSAY OF PSA TOTAL: CPT | Performed by: INTERNAL MEDICINE

## 2021-10-28 ENCOUNTER — PATIENT OUTREACH (OUTPATIENT)
Dept: ADMINISTRATIVE | Facility: HOSPITAL | Age: 76
End: 2021-10-28
Payer: COMMERCIAL

## 2021-11-22 DIAGNOSIS — Z12.11 COLON CANCER SCREENING: Primary | ICD-10-CM

## 2021-11-22 RX ORDER — SODIUM, POTASSIUM,MAG SULFATES 17.5-3.13G
1 SOLUTION, RECONSTITUTED, ORAL ORAL DAILY
Qty: 1 KIT | Refills: 0 | Status: SHIPPED | OUTPATIENT
Start: 2021-11-22 | End: 2021-11-24

## 2021-12-23 ENCOUNTER — ANESTHESIA (OUTPATIENT)
Dept: ENDOSCOPY | Facility: HOSPITAL | Age: 76
End: 2021-12-23
Payer: COMMERCIAL

## 2021-12-23 ENCOUNTER — TELEPHONE (OUTPATIENT)
Dept: INTERNAL MEDICINE | Facility: CLINIC | Age: 76
End: 2021-12-23
Payer: COMMERCIAL

## 2021-12-23 ENCOUNTER — ANESTHESIA EVENT (OUTPATIENT)
Dept: ENDOSCOPY | Facility: HOSPITAL | Age: 76
End: 2021-12-23
Payer: COMMERCIAL

## 2021-12-23 ENCOUNTER — HOSPITAL ENCOUNTER (OUTPATIENT)
Facility: HOSPITAL | Age: 76
Discharge: HOME | End: 2021-12-23
Attending: INTERNAL MEDICINE | Admitting: INTERNAL MEDICINE
Payer: COMMERCIAL

## 2021-12-23 VITALS
WEIGHT: 160 LBS | HEIGHT: 69 IN | RESPIRATION RATE: 16 BRPM | TEMPERATURE: 98 F | BODY MASS INDEX: 23.7 KG/M2 | OXYGEN SATURATION: 99 % | DIASTOLIC BLOOD PRESSURE: 65 MMHG | HEART RATE: 65 BPM | SYSTOLIC BLOOD PRESSURE: 119 MMHG

## 2021-12-23 DIAGNOSIS — R10.13 EPIGASTRIC DISCOMFORT: ICD-10-CM

## 2021-12-23 DIAGNOSIS — D12.6 COLON ADENOMA: ICD-10-CM

## 2021-12-23 DIAGNOSIS — R63.4 UNINTENTIONAL WEIGHT LOSS: Primary | ICD-10-CM

## 2021-12-23 PROCEDURE — 45385 PR COLONOSCOPY,REMV LESN,SNARE: ICD-10-PCS | Mod: 33,,, | Performed by: INTERNAL MEDICINE

## 2021-12-23 PROCEDURE — 45385 COLONOSCOPY W/LESION REMOVAL: CPT | Performed by: INTERNAL MEDICINE

## 2021-12-23 PROCEDURE — 25000003 PHARM REV CODE 250: Performed by: INTERNAL MEDICINE

## 2021-12-23 PROCEDURE — E9220 PRA ENDO ANESTHESIA: ICD-10-PCS | Mod: ,,, | Performed by: STUDENT IN AN ORGANIZED HEALTH CARE EDUCATION/TRAINING PROGRAM

## 2021-12-23 PROCEDURE — 63600175 PHARM REV CODE 636 W HCPCS: Performed by: STUDENT IN AN ORGANIZED HEALTH CARE EDUCATION/TRAINING PROGRAM

## 2021-12-23 PROCEDURE — 37000009 HC ANESTHESIA EA ADD 15 MINS: Performed by: INTERNAL MEDICINE

## 2021-12-23 PROCEDURE — 88305 TISSUE EXAM BY PATHOLOGIST: CPT | Mod: 26,,, | Performed by: PATHOLOGY

## 2021-12-23 PROCEDURE — 88305 TISSUE EXAM BY PATHOLOGIST: CPT | Performed by: PATHOLOGY

## 2021-12-23 PROCEDURE — 88305 TISSUE EXAM BY PATHOLOGIST: ICD-10-PCS | Mod: 26,,, | Performed by: PATHOLOGY

## 2021-12-23 PROCEDURE — 37000008 HC ANESTHESIA 1ST 15 MINUTES: Performed by: INTERNAL MEDICINE

## 2021-12-23 PROCEDURE — 27201089 HC SNARE, DISP (ANY): Performed by: INTERNAL MEDICINE

## 2021-12-23 PROCEDURE — 45385 COLONOSCOPY W/LESION REMOVAL: CPT | Mod: 33,,, | Performed by: INTERNAL MEDICINE

## 2021-12-23 PROCEDURE — E9220 PRA ENDO ANESTHESIA: HCPCS | Mod: ,,, | Performed by: STUDENT IN AN ORGANIZED HEALTH CARE EDUCATION/TRAINING PROGRAM

## 2021-12-23 RX ORDER — SODIUM CHLORIDE 9 MG/ML
INJECTION, SOLUTION INTRAVENOUS CONTINUOUS
Status: DISCONTINUED | OUTPATIENT
Start: 2021-12-23 | End: 2021-12-23 | Stop reason: HOSPADM

## 2021-12-23 RX ORDER — LIDOCAINE HCL/PF 100 MG/5ML
SYRINGE (ML) INTRAVENOUS
Status: DISCONTINUED | OUTPATIENT
Start: 2021-12-23 | End: 2021-12-23

## 2021-12-23 RX ORDER — PROPOFOL 10 MG/ML
VIAL (ML) INTRAVENOUS
Status: DISCONTINUED | OUTPATIENT
Start: 2021-12-23 | End: 2021-12-23

## 2021-12-23 RX ORDER — PROPOFOL 10 MG/ML
VIAL (ML) INTRAVENOUS CONTINUOUS PRN
Status: DISCONTINUED | OUTPATIENT
Start: 2021-12-23 | End: 2021-12-23

## 2021-12-23 RX ADMIN — SODIUM CHLORIDE: 0.9 INJECTION, SOLUTION INTRAVENOUS at 09:12

## 2021-12-23 RX ADMIN — PROPOFOL 80 MG: 10 INJECTION, EMULSION INTRAVENOUS at 10:12

## 2021-12-23 RX ADMIN — Medication 50 MG: at 10:12

## 2021-12-23 RX ADMIN — SODIUM CHLORIDE: 0.9 INJECTION, SOLUTION INTRAVENOUS at 10:12

## 2021-12-23 RX ADMIN — PROPOFOL 150 MCG/KG/MIN: 10 INJECTION, EMULSION INTRAVENOUS at 10:12

## 2022-01-03 LAB
FINAL PATHOLOGIC DIAGNOSIS: NORMAL
GROSS: NORMAL
Lab: NORMAL

## 2022-01-13 ENCOUNTER — HOSPITAL ENCOUNTER (OUTPATIENT)
Dept: RADIOLOGY | Facility: HOSPITAL | Age: 77
Discharge: HOME | End: 2022-01-13
Attending: INTERNAL MEDICINE
Payer: COMMERCIAL

## 2022-01-13 DIAGNOSIS — R63.4 UNINTENTIONAL WEIGHT LOSS: ICD-10-CM

## 2022-01-13 DIAGNOSIS — R10.13 EPIGASTRIC DISCOMFORT: ICD-10-CM

## 2022-01-13 PROCEDURE — 74177 CT ABD & PELVIS W/CONTRAST: CPT | Mod: TC

## 2022-01-13 PROCEDURE — 74177 CT ABDOMEN PELVIS WITH CONTRAST: ICD-10-PCS | Mod: 26,,, | Performed by: RADIOLOGY

## 2022-01-13 PROCEDURE — 74177 CT ABD & PELVIS W/CONTRAST: CPT | Mod: 26,,, | Performed by: RADIOLOGY

## 2022-01-13 PROCEDURE — 25500020 PHARM REV CODE 255: Performed by: INTERNAL MEDICINE

## 2022-01-13 RX ADMIN — IOHEXOL 75 ML: 350 INJECTION, SOLUTION INTRAVENOUS at 02:01

## 2022-01-14 ENCOUNTER — TELEPHONE (OUTPATIENT)
Dept: PRIMARY CARE CLINIC | Facility: CLINIC | Age: 77
End: 2022-01-14
Payer: COMMERCIAL

## 2022-01-14 LAB
CREAT SERPL-MCNC: 0.9 MG/DL (ref 0.5–1.4)
SAMPLE: NORMAL

## 2022-01-14 NOTE — TELEPHONE ENCOUNTER
Advised patient of his ct results. LL lesion stable/ not growing. Advised to continue cholesterol meds, and baby aspirin. He asked about upcoming appt with MD.  Also wanted to know if Dr Bunch was still his PCP. I advised I would call him back after I confirmed if he was to continue seeing Dr Bunch, or needed to find another PCP.

## 2022-01-14 NOTE — TELEPHONE ENCOUNTER
Please call and notify pt:    CT of the belly did not show anything to explain the weight loss. Pancreas looks normal. There's a 1.1cm lesion at the left lower lung that's stable since 2015. Since it hasn't grown in size all these years, it's likely benign (not cancerous). You do have some very small gallstones but there is no gallbladder infection. There are some plaques seen in the aorta - blood vessel connected the heart to the rest of the body. Continue cholesterol medicine and baby aspirin to decrease risk of cardiovascular events.

## 2022-01-26 ENCOUNTER — TELEPHONE (OUTPATIENT)
Dept: GASTROENTEROLOGY | Facility: CLINIC | Age: 77
End: 2022-01-26
Payer: COMMERCIAL

## 2022-01-26 NOTE — TELEPHONE ENCOUNTER
Contact patient per Bright Giraldo please tell Jessee hat his colon polyp was benign no dysplasia  No answer.

## 2022-01-26 NOTE — TELEPHONE ENCOUNTER
----- Message from Jason Brian MD sent at 1/23/2022  4:14 PM CST -----  Bright please tell Jessee hat his colon polyp was benign no dysplasia     1. Colon, cecum, polyp, biopsy:   - Polypoid colonic mucosa with no diagnostic histopathologic alterations   - Negative for dysplasia   - Deeper levels examined   Comment: Interp By Jones Call MD, Signed on 01/03/2022

## 2022-02-04 DIAGNOSIS — K55.1 SUPERIOR MESENTERIC ARTERY STENOSIS: ICD-10-CM

## 2022-02-04 DIAGNOSIS — I70.0 AORTIC ATHEROSCLEROSIS: ICD-10-CM

## 2022-02-04 DIAGNOSIS — E78.5 HYPERLIPIDEMIA, UNSPECIFIED HYPERLIPIDEMIA TYPE: ICD-10-CM

## 2022-02-04 RX ORDER — ATORVASTATIN CALCIUM 40 MG/1
TABLET, FILM COATED ORAL
Qty: 90 TABLET | Refills: 0 | Status: SHIPPED | OUTPATIENT
Start: 2022-02-04 | End: 2022-02-10 | Stop reason: SDUPTHER

## 2022-02-10 ENCOUNTER — OFFICE VISIT (OUTPATIENT)
Dept: INTERNAL MEDICINE | Facility: CLINIC | Age: 77
End: 2022-02-10
Payer: COMMERCIAL

## 2022-02-10 VITALS
HEIGHT: 69 IN | WEIGHT: 163.13 LBS | HEART RATE: 88 BPM | OXYGEN SATURATION: 98 % | DIASTOLIC BLOOD PRESSURE: 68 MMHG | SYSTOLIC BLOOD PRESSURE: 140 MMHG | BODY MASS INDEX: 24.16 KG/M2

## 2022-02-10 DIAGNOSIS — R63.4 UNINTENTIONAL WEIGHT LOSS: ICD-10-CM

## 2022-02-10 DIAGNOSIS — E78.5 HYPERLIPIDEMIA, UNSPECIFIED HYPERLIPIDEMIA TYPE: ICD-10-CM

## 2022-02-10 DIAGNOSIS — K55.1 SUPERIOR MESENTERIC ARTERY STENOSIS: ICD-10-CM

## 2022-02-10 DIAGNOSIS — I10 PRIMARY HYPERTENSION: ICD-10-CM

## 2022-02-10 DIAGNOSIS — Z76.89 ENCOUNTER TO ESTABLISH CARE WITH NEW DOCTOR: Primary | ICD-10-CM

## 2022-02-10 DIAGNOSIS — I70.0 AORTIC ATHEROSCLEROSIS: ICD-10-CM

## 2022-02-10 PROCEDURE — 99999 PR PBB SHADOW E&M-EST. PATIENT-LVL IV: CPT | Mod: PBBFAC,,, | Performed by: STUDENT IN AN ORGANIZED HEALTH CARE EDUCATION/TRAINING PROGRAM

## 2022-02-10 PROCEDURE — 99214 OFFICE O/P EST MOD 30 MIN: CPT | Mod: S$GLB,,, | Performed by: STUDENT IN AN ORGANIZED HEALTH CARE EDUCATION/TRAINING PROGRAM

## 2022-02-10 PROCEDURE — 1160F PR REVIEW ALL MEDS BY PRESCRIBER/CLIN PHARMACIST DOCUMENTED: ICD-10-PCS | Mod: CPTII,S$GLB,, | Performed by: STUDENT IN AN ORGANIZED HEALTH CARE EDUCATION/TRAINING PROGRAM

## 2022-02-10 PROCEDURE — 99214 PR OFFICE/OUTPT VISIT, EST, LEVL IV, 30-39 MIN: ICD-10-PCS | Mod: S$GLB,,, | Performed by: STUDENT IN AN ORGANIZED HEALTH CARE EDUCATION/TRAINING PROGRAM

## 2022-02-10 PROCEDURE — 3078F PR MOST RECENT DIASTOLIC BLOOD PRESSURE < 80 MM HG: ICD-10-PCS | Mod: CPTII,S$GLB,, | Performed by: STUDENT IN AN ORGANIZED HEALTH CARE EDUCATION/TRAINING PROGRAM

## 2022-02-10 PROCEDURE — 99999 PR PBB SHADOW E&M-EST. PATIENT-LVL IV: ICD-10-PCS | Mod: PBBFAC,,, | Performed by: STUDENT IN AN ORGANIZED HEALTH CARE EDUCATION/TRAINING PROGRAM

## 2022-02-10 PROCEDURE — 3077F PR MOST RECENT SYSTOLIC BLOOD PRESSURE >= 140 MM HG: ICD-10-PCS | Mod: CPTII,S$GLB,, | Performed by: STUDENT IN AN ORGANIZED HEALTH CARE EDUCATION/TRAINING PROGRAM

## 2022-02-10 PROCEDURE — 3288F FALL RISK ASSESSMENT DOCD: CPT | Mod: CPTII,S$GLB,, | Performed by: STUDENT IN AN ORGANIZED HEALTH CARE EDUCATION/TRAINING PROGRAM

## 2022-02-10 PROCEDURE — 1101F PT FALLS ASSESS-DOCD LE1/YR: CPT | Mod: CPTII,S$GLB,, | Performed by: STUDENT IN AN ORGANIZED HEALTH CARE EDUCATION/TRAINING PROGRAM

## 2022-02-10 PROCEDURE — 1126F PR PAIN SEVERITY QUANTIFIED, NO PAIN PRESENT: ICD-10-PCS | Mod: CPTII,S$GLB,, | Performed by: STUDENT IN AN ORGANIZED HEALTH CARE EDUCATION/TRAINING PROGRAM

## 2022-02-10 PROCEDURE — 1159F MED LIST DOCD IN RCRD: CPT | Mod: CPTII,S$GLB,, | Performed by: STUDENT IN AN ORGANIZED HEALTH CARE EDUCATION/TRAINING PROGRAM

## 2022-02-10 PROCEDURE — 3008F PR BODY MASS INDEX (BMI) DOCUMENTED: ICD-10-PCS | Mod: CPTII,S$GLB,, | Performed by: STUDENT IN AN ORGANIZED HEALTH CARE EDUCATION/TRAINING PROGRAM

## 2022-02-10 PROCEDURE — 3288F PR FALLS RISK ASSESSMENT DOCUMENTED: ICD-10-PCS | Mod: CPTII,S$GLB,, | Performed by: STUDENT IN AN ORGANIZED HEALTH CARE EDUCATION/TRAINING PROGRAM

## 2022-02-10 PROCEDURE — 1126F AMNT PAIN NOTED NONE PRSNT: CPT | Mod: CPTII,S$GLB,, | Performed by: STUDENT IN AN ORGANIZED HEALTH CARE EDUCATION/TRAINING PROGRAM

## 2022-02-10 PROCEDURE — 1160F RVW MEDS BY RX/DR IN RCRD: CPT | Mod: CPTII,S$GLB,, | Performed by: STUDENT IN AN ORGANIZED HEALTH CARE EDUCATION/TRAINING PROGRAM

## 2022-02-10 PROCEDURE — 1101F PR PT FALLS ASSESS DOC 0-1 FALLS W/OUT INJ PAST YR: ICD-10-PCS | Mod: CPTII,S$GLB,, | Performed by: STUDENT IN AN ORGANIZED HEALTH CARE EDUCATION/TRAINING PROGRAM

## 2022-02-10 PROCEDURE — 3078F DIAST BP <80 MM HG: CPT | Mod: CPTII,S$GLB,, | Performed by: STUDENT IN AN ORGANIZED HEALTH CARE EDUCATION/TRAINING PROGRAM

## 2022-02-10 PROCEDURE — 1159F PR MEDICATION LIST DOCUMENTED IN MEDICAL RECORD: ICD-10-PCS | Mod: CPTII,S$GLB,, | Performed by: STUDENT IN AN ORGANIZED HEALTH CARE EDUCATION/TRAINING PROGRAM

## 2022-02-10 PROCEDURE — 3008F BODY MASS INDEX DOCD: CPT | Mod: CPTII,S$GLB,, | Performed by: STUDENT IN AN ORGANIZED HEALTH CARE EDUCATION/TRAINING PROGRAM

## 2022-02-10 PROCEDURE — 3077F SYST BP >= 140 MM HG: CPT | Mod: CPTII,S$GLB,, | Performed by: STUDENT IN AN ORGANIZED HEALTH CARE EDUCATION/TRAINING PROGRAM

## 2022-02-10 RX ORDER — ATORVASTATIN CALCIUM 40 MG/1
40 TABLET, FILM COATED ORAL DAILY
Qty: 90 TABLET | Refills: 3 | Status: SHIPPED | OUTPATIENT
Start: 2022-02-10 | End: 2022-08-05 | Stop reason: SDUPTHER

## 2022-02-10 NOTE — PROGRESS NOTES
INTERNAL MEDICINE INITIAL VISIT NOTE      CHIEF COMPLAINT     Chief Complaint   Patient presents with    Transfer Care       ASSESSMENT/PLAN     Jessee Dubose is a 76 y.o. male who presents with HTN, HLD, fatty liver, SMA stenosis, ED, here today to establish care.    1. Encounter to establish care with new doctor  2. Unintentional weight loss  3. Superior mesenteric artery stenosis  - atorvastatin (LIPITOR) 40 MG tablet; Take 1 tablet (40 mg total) by mouth once daily.  Dispense: 90 tablet; Refill: 3  4. Hyperlipidemia, unspecified hyperlipidemia type  - atorvastatin (LIPITOR) 40 MG tablet; Take 1 tablet (40 mg total) by mouth once daily.  Dispense: 90 tablet; Refill: 3  5. Aortic atherosclerosis  - atorvastatin (LIPITOR) 40 MG tablet; Take 1 tablet (40 mg total) by mouth once daily.  Dispense: 90 tablet; Refill: 3  6. Primary hypertension      All previous labs, imaging, and notes reviewed up to the time point of this note.   Reviewed 20 lbs unintentional weight loss over the past 2 years, no red flags and patient reports he is doing well without B symptoms, will monitor for now. BP borderline, well controlled at home off therapy and cholesterol well controlled.     HM reviewed and discussed in detail with the patient.     RTC in 6 months, sooner if needed and depending on labs.    HPI     Jessee Dubose is a 76 y.o. male who presents with HTN, HLD, fatty liver, SMA stenosis, ED, here today to establish care.    Patient was previously seen by Dr. Bunch.   He reports he is generally good health. Had a benign colon polyp.     Unintentional weight loss: Weight has been down recently, 20 lbs over the past 2-3 years. CT abd/pelvis 1/13/22 was wnl. Very low risk of Hep C or HIV, no indication to test. No B symptoms. He endorses reduced appetite since he stopped working, age 65, but weight loss has been more recently. Hx of heavy drinking in 2015, has since stopped.     SMA stenosis - Seen by vascular surgery in  2020 for SMA stenosis, asymptomatic. Told to continue aspirin, statin, no sxs of mesenteric ischemia and CT showed patent SMA. Was recommended to follow up in 1 year with mesenteric duplex.     HTN - borderline today. 140/68. Has never been on blood pressure medication.     HLD - on lipitor 40 mg and aspirin 81 mg           Past Medical History:  Past Medical History:   Diagnosis Date    Allergy     Cataract     Diverticulosis     Hyperlipidemia     Joint pain     SMA stenosis     Steatosis of liver        Past Surgical History:  Past Surgical History:   Procedure Laterality Date    APPENDECTOMY      CATARACT EXTRACTION  11/4/13    right & left eye    CATARACT EXTRACTION W/  INTRAOCULAR LENS IMPLANT  11/18/13    Left Eye/ Dr Gupta    COLONOSCOPY N/A 8/10/2016    Procedure: COLONOSCOPY;  Surgeon: Adolfo Moore MD;  [facility]  Service: Endoscopy;  Laterality: N/A;  Patient has a history of Colon Polyps.    COLONOSCOPY N/A 12/23/2021    Procedure: COLONOSCOPY;  Surgeon: Jason Brian MD;  Location: [facility] ([address] [place] [address]);  Service: Endoscopy;  Laterality: N/A;  Pt requesting 1st eli SINGLETON and December date/ fully vaccinated / prep ins. mailed - ERW       Home Medications:  Prior to Admission medications    Medication Sig Start Date End Date Taking? Authorizing Provider   aspirin (ECOTRIN) 81 MG EC tablet Take 1 tablet (81 mg total) by mouth once daily. 8/5/20 8/5/21 Yes Elizabeth Bunch MD   atorvastatin (LIPITOR) 40 MG tablet Take 1 tablet by mouth once daily 2/4/22  Yes Elizabeth Bunch MD   clotrimazole-betamethasone 1-0.05% (LOTRISONE) cream Apply topically 2 (two) times daily. 10/25/21  Yes Elizabeth Bunch MD   co-enzyme Q-10 30 mg capsule Take 30 mg by mouth 3 (three) times daily.   Yes Historical Provider   olopatadine (PATANOL) 0.1 % ophthalmic solution Place 1 drop into both eyes 2 (two) times daily. 10/1/12 3/13/19 Yes Rodrigo Montalvo OD   omeprazole (PRILOSEC) 40 MG capsule Take 1 capsule  (40 mg total) by mouth once daily. 10/25/21 10/25/22 Yes Elizabeth Bunch MD   fish oil-omega-3 fatty acids 300-1,000 mg capsule Take by mouth once daily.    Historical Provider       Allergies:  Review of patient's allergies indicates:   Allergen Reactions    Hay fever and allergy relief Other (See Comments)     Runny nose, sinus congestion.       Family History:  Family History   Problem Relation Age of Onset    Heart disease Father 66        MI    No Known Problems Brother     Cataracts Maternal Grandmother     Diabetes Maternal Grandmother     Cancer Maternal Grandmother     Diabetes Paternal Grandmother     Glaucoma Neg Hx     Blindness Neg Hx     Melanoma Neg Hx        Social History:  Social History     Tobacco Use    Smoking status: Never Smoker    Smokeless tobacco: Never Used   Substance Use Topics    Alcohol use: Yes     Comment: 1 beer every few months    Drug use: No       Review of Systems:  Review of Systems   Constitutional: Negative for fever and unexpected weight change.   HENT: Negative for sinus pressure and sore throat.    Eyes: Negative for visual disturbance.   Respiratory: Negative for cough and shortness of breath.    Cardiovascular: Negative for chest pain and palpitations.   Gastrointestinal: Negative for constipation, diarrhea, nausea and vomiting.   Endocrine: Negative for polyuria.   Genitourinary: Negative for dysuria and urgency.   Musculoskeletal: Negative for arthralgias and myalgias.   Skin: Negative for rash.   Allergic/Immunologic: Negative for environmental allergies.   Neurological: Negative for dizziness, light-headedness and headaches.   Hematological: Does not bruise/bleed easily.   Psychiatric/Behavioral: Negative for agitation and decreased concentration. The patient is not nervous/anxious.        Health Maintenance:   covid booster - needs to schedule       PHYSICAL EXAM     BP (!) 140/68 (BP Location: Left arm, Patient Position: Sitting, BP Method: Medium (Manual))  "  Pulse 88   Ht 5' 9" (1.753 m)   Wt 74 kg (163 lb 2.3 oz)   SpO2 98%   BMI 24.09 kg/m²     GEN - A+OX4, NAD elderly, well appearing man   HEENT - PERRL, EOMI, OP clear  Neck - No thyromegaly or thyroid masses felt.  No cervical lymphadenopathy appreciated.  CV - RRR, no m/r/g   Chest - CTAB, no wheezing, crackles, or rhonchi   Abd - S/NT/ND/+BS.   Ext - 2+BDP. No C/C/E.  LN - No LAD appreciated.  Skin - Normal color and texture, no rash, no skin lesions.      LABS     Lab Results   Component Value Date    WBC 6.72 10/26/2021    HGB 14.9 10/26/2021    HCT 43.4 10/26/2021    MCV 92 10/26/2021     10/26/2021     Sodium   Date Value Ref Range Status   10/26/2021 140 136 - 145 mmol/L Final     Potassium   Date Value Ref Range Status   10/26/2021 4.5 3.5 - 5.1 mmol/L Final     Chloride   Date Value Ref Range Status   10/26/2021 104 95 - 110 mmol/L Final     CO2   Date Value Ref Range Status   10/26/2021 28 23 - 29 mmol/L Final     Glucose   Date Value Ref Range Status   10/26/2021 110 70 - 110 mg/dL Final     BUN   Date Value Ref Range Status   10/26/2021 22 8 - 23 mg/dL Final     Creatinine   Date Value Ref Range Status   10/26/2021 1.0 0.5 - 1.4 mg/dL Final     Calcium   Date Value Ref Range Status   10/26/2021 9.9 8.7 - 10.5 mg/dL Final     Total Protein   Date Value Ref Range Status   10/26/2021 6.6 6.0 - 8.4 g/dL Final     Albumin   Date Value Ref Range Status   10/26/2021 3.8 3.5 - 5.2 g/dL Final     Total Bilirubin   Date Value Ref Range Status   10/26/2021 1.0 0.1 - 1.0 mg/dL Final     Comment:     For infants and newborns, interpretation of results should be based  on gestational age, weight and in agreement with clinical  observations.    Premature Infant recommended reference ranges:  Up to 24 hours.............<8.0 mg/dL  Up to 48 hours............<12.0 mg/dL  3-5 days..................<15.0 mg/dL  6-29 days.................<15.0 mg/dL       Alkaline Phosphatase   Date Value Ref Range Status "   10/26/2021 78 55 - 135 U/L Final     AST   Date Value Ref Range Status   10/26/2021 21 10 - 40 U/L Final     ALT   Date Value Ref Range Status   10/26/2021 19 10 - 44 U/L Final     Anion Gap   Date Value Ref Range Status   10/26/2021 8 8 - 16 mmol/L Final     eGFR if    Date Value Ref Range Status   10/26/2021 >60.0 >60 mL/min/1.73 m^2 Final     eGFR if non    Date Value Ref Range Status   10/26/2021 >60.0 >60 mL/min/1.73 m^2 Final     Comment:     Calculation used to obtain the estimated glomerular filtration  rate (eGFR) is the CKD-EPI equation.        Lab Results   Component Value Date    HGBA1C 5.3 08/05/2020     Lab Results   Component Value Date    LDLCALC 53.8 (L) 10/26/2021     Lab Results   Component Value Date    TSH 1.436 08/05/2020           Radha Barrios MD   [facility]   Department of Internal Medicine   02/10/2022

## 2022-05-05 ENCOUNTER — TELEPHONE (OUTPATIENT)
Dept: PRIMARY CARE CLINIC | Facility: CLINIC | Age: 77
End: 2022-05-05
Payer: COMMERCIAL

## 2022-05-05 NOTE — TELEPHONE ENCOUNTER
Can you ask Sadaf about it? I'd be more than happy to accept him into 65+ clinic if his insurance qualifies.

## 2022-05-05 NOTE — TELEPHONE ENCOUNTER
----- Message from Jordana Mccabe sent at 5/5/2022 10:29 AM CDT -----  Contact: Pt [phone]  Consult    Patient says when you started the 65 plus clinic he didn't have medicare but, now he does so will you accept him as a new patient. He said Medicare part B starts on 7/1/22.  Thank you

## 2022-08-05 ENCOUNTER — OFFICE VISIT (OUTPATIENT)
Dept: PRIMARY CARE CLINIC | Facility: CLINIC | Age: 77
End: 2022-08-05
Payer: MEDICARE

## 2022-08-05 ENCOUNTER — LAB VISIT (OUTPATIENT)
Dept: LAB | Facility: HOSPITAL | Age: 77
End: 2022-08-05
Attending: INTERNAL MEDICINE
Payer: MEDICARE

## 2022-08-05 VITALS
HEART RATE: 71 BPM | BODY MASS INDEX: 24.75 KG/M2 | DIASTOLIC BLOOD PRESSURE: 68 MMHG | SYSTOLIC BLOOD PRESSURE: 128 MMHG | OXYGEN SATURATION: 98 % | WEIGHT: 167.13 LBS | HEIGHT: 69 IN

## 2022-08-05 DIAGNOSIS — I70.0 AORTIC ATHEROSCLEROSIS: ICD-10-CM

## 2022-08-05 DIAGNOSIS — L57.0 ACTINIC KERATOSIS: ICD-10-CM

## 2022-08-05 DIAGNOSIS — E78.5 HYPERLIPIDEMIA, UNSPECIFIED HYPERLIPIDEMIA TYPE: ICD-10-CM

## 2022-08-05 DIAGNOSIS — E78.5 HYPERLIPIDEMIA, UNSPECIFIED HYPERLIPIDEMIA TYPE: Primary | ICD-10-CM

## 2022-08-05 DIAGNOSIS — E55.9 VITAMIN D DEFICIENCY: ICD-10-CM

## 2022-08-05 DIAGNOSIS — K76.0 FATTY LIVER: ICD-10-CM

## 2022-08-05 DIAGNOSIS — L30.9 DERMATITIS: ICD-10-CM

## 2022-08-05 DIAGNOSIS — R97.20 ELEVATED PROSTATE SPECIFIC ANTIGEN (PSA): ICD-10-CM

## 2022-08-05 DIAGNOSIS — K55.1 SUPERIOR MESENTERIC ARTERY STENOSIS: ICD-10-CM

## 2022-08-05 LAB
25(OH)D3+25(OH)D2 SERPL-MCNC: 35 NG/ML (ref 30–96)
ALBUMIN SERPL BCP-MCNC: 4.3 G/DL (ref 3.5–5.2)
ALP SERPL-CCNC: 77 U/L (ref 55–135)
ALT SERPL W/O P-5'-P-CCNC: 30 U/L (ref 10–44)
ANION GAP SERPL CALC-SCNC: 7 MMOL/L (ref 8–16)
AST SERPL-CCNC: 23 U/L (ref 10–40)
BASOPHILS # BLD AUTO: 0.08 K/UL (ref 0–0.2)
BASOPHILS NFR BLD: 1.1 % (ref 0–1.9)
BILIRUB SERPL-MCNC: 1 MG/DL (ref 0.1–1)
BUN SERPL-MCNC: 19 MG/DL (ref 8–23)
CALCIUM SERPL-MCNC: 10.1 MG/DL (ref 8.7–10.5)
CHLORIDE SERPL-SCNC: 104 MMOL/L (ref 95–110)
CHOLEST SERPL-MCNC: 121 MG/DL (ref 120–199)
CHOLEST/HDLC SERPL: 2.9 {RATIO} (ref 2–5)
CO2 SERPL-SCNC: 30 MMOL/L (ref 23–29)
COMPLEXED PSA SERPL-MCNC: 6.6 NG/ML (ref 0–4)
CREAT SERPL-MCNC: 1 MG/DL (ref 0.5–1.4)
DIFFERENTIAL METHOD: ABNORMAL
EOSINOPHIL # BLD AUTO: 0.5 K/UL (ref 0–0.5)
EOSINOPHIL NFR BLD: 6.9 % (ref 0–8)
ERYTHROCYTE [DISTWIDTH] IN BLOOD BY AUTOMATED COUNT: 13.1 % (ref 11.5–14.5)
EST. GFR  (NO RACE VARIABLE): >60 ML/MIN/1.73 M^2
GLUCOSE SERPL-MCNC: 107 MG/DL (ref 70–110)
HCT VFR BLD AUTO: 44.9 % (ref 40–54)
HDLC SERPL-MCNC: 42 MG/DL (ref 40–75)
HDLC SERPL: 34.7 % (ref 20–50)
HGB BLD-MCNC: 15.4 G/DL (ref 14–18)
IMM GRANULOCYTES # BLD AUTO: 0.03 K/UL (ref 0–0.04)
IMM GRANULOCYTES NFR BLD AUTO: 0.4 % (ref 0–0.5)
LDLC SERPL CALC-MCNC: 61.4 MG/DL (ref 63–159)
LYMPHOCYTES # BLD AUTO: 1 K/UL (ref 1–4.8)
LYMPHOCYTES NFR BLD: 13.7 % (ref 18–48)
MCH RBC QN AUTO: 32.1 PG (ref 27–31)
MCHC RBC AUTO-ENTMCNC: 34.3 G/DL (ref 32–36)
MCV RBC AUTO: 94 FL (ref 82–98)
MONOCYTES # BLD AUTO: 0.6 K/UL (ref 0.3–1)
MONOCYTES NFR BLD: 8.5 % (ref 4–15)
NEUTROPHILS # BLD AUTO: 5.2 K/UL (ref 1.8–7.7)
NEUTROPHILS NFR BLD: 69.4 % (ref 38–73)
NONHDLC SERPL-MCNC: 79 MG/DL
NRBC BLD-RTO: 0 /100 WBC
PLATELET # BLD AUTO: 301 K/UL (ref 150–450)
PMV BLD AUTO: 10.2 FL (ref 9.2–12.9)
POTASSIUM SERPL-SCNC: 5 MMOL/L (ref 3.5–5.1)
PROT SERPL-MCNC: 7.5 G/DL (ref 6–8.4)
RBC # BLD AUTO: 4.8 M/UL (ref 4.6–6.2)
SODIUM SERPL-SCNC: 141 MMOL/L (ref 136–145)
TRIGL SERPL-MCNC: 88 MG/DL (ref 30–150)
WBC # BLD AUTO: 7.51 K/UL (ref 3.9–12.7)

## 2022-08-05 PROCEDURE — 80061 LIPID PANEL: CPT | Performed by: INTERNAL MEDICINE

## 2022-08-05 PROCEDURE — 84153 ASSAY OF PSA TOTAL: CPT | Performed by: INTERNAL MEDICINE

## 2022-08-05 PROCEDURE — 80053 COMPREHEN METABOLIC PANEL: CPT | Performed by: INTERNAL MEDICINE

## 2022-08-05 PROCEDURE — 82306 VITAMIN D 25 HYDROXY: CPT | Performed by: INTERNAL MEDICINE

## 2022-08-05 PROCEDURE — 99999 PR PBB SHADOW E&M-EST. PATIENT-LVL III: CPT | Mod: PBBFAC,,, | Performed by: INTERNAL MEDICINE

## 2022-08-05 PROCEDURE — 85025 COMPLETE CBC W/AUTO DIFF WBC: CPT | Performed by: INTERNAL MEDICINE

## 2022-08-05 PROCEDURE — 36415 COLL VENOUS BLD VENIPUNCTURE: CPT | Mod: PN | Performed by: INTERNAL MEDICINE

## 2022-08-05 PROCEDURE — 99214 PR OFFICE/OUTPT VISIT, EST, LEVL IV, 30-39 MIN: ICD-10-PCS | Mod: S$PBB,,, | Performed by: INTERNAL MEDICINE

## 2022-08-05 PROCEDURE — 99213 OFFICE O/P EST LOW 20 MIN: CPT | Mod: PBBFAC,PN | Performed by: INTERNAL MEDICINE

## 2022-08-05 PROCEDURE — 99999 PR PBB SHADOW E&M-EST. PATIENT-LVL III: ICD-10-PCS | Mod: PBBFAC,,, | Performed by: INTERNAL MEDICINE

## 2022-08-05 PROCEDURE — 99214 OFFICE O/P EST MOD 30 MIN: CPT | Mod: S$PBB,,, | Performed by: INTERNAL MEDICINE

## 2022-08-05 RX ORDER — ATORVASTATIN CALCIUM 40 MG/1
40 TABLET, FILM COATED ORAL DAILY
Qty: 90 TABLET | Refills: 3 | Status: SHIPPED | OUTPATIENT
Start: 2022-08-05 | End: 2023-02-03 | Stop reason: SDUPTHER

## 2022-08-05 RX ORDER — CLOTRIMAZOLE AND BETAMETHASONE DIPROPIONATE 10; .64 MG/G; MG/G
CREAM TOPICAL 2 TIMES DAILY
Qty: 45 G | Refills: 3 | Status: SHIPPED | OUTPATIENT
Start: 2022-08-05 | End: 2023-03-21 | Stop reason: SDUPTHER

## 2022-08-05 NOTE — PROGRESS NOTES
"Subjective:       Patient ID: Jessee Dubose is a 76 y.o. male.    Chief Complaint: HLD f/u    HPI   Cscope - 12/23/21 - normal. 2mm polyp. Diverticulosis. Internal hemorrhoids. Repeat in 5 yrs. Dr. Brian.  Lives w/ wife. No assistance w/ ADLs - cuts the grass; wife does most of the housework just because. Does not drive. Wife drives him around. Hasn't been exercising much - no SOB/talley/cp/palpitations/stomach pain. Watches TV most of the time and reads.  Had 2 COVID vaccines. Did ok. Declines flu vaccine.  Feels like he doesn't eat as much as he used to. Weight gain of about 7lbs in the last yr.      HLD - atorvastatin 40mg daily.  CT A/P 1/13/22 - moderate aortoiliac calcific atherosclerosis.  LDL - 10/26/21 53.8  Doesn't eat a lot of meats.      Vit D def.   Vit D 10/26/21 33.     Elevated PSA. 10/26/21 5.5  H/o benign TURP 5/13/16 w/ Dr. Grier.  LOV w/ Dr. Grier 9/28/20.   No issues w/ urination.      H/o colitis in 2015. Mesenteric US w/ stenosis. Seen Dr. VAZQUEZ in 2015. Asymptomatic. No issues w/ abdominal pain. No bloody stools. Does have constipation - taking a little bit of mineral oil. No nausea/vomiting. Rec statin and asa for atherosclerosis.  Saw Dr. Zarate 10/2020 - low suspicion for mesenteric ischemia given lack of symptoms. Return in 12 mo w/ mesenteric US  Reports sometimes when after he drinks coffee on an empty stomach, may get some abdominal cramps. Doesn't cause any issues w/ eating.   No nausea/vomiting.      Fatty liver. Cholelithiasis.   Used be quite an alcohol drinker but only drinks occasionally now when he goes out to eat.     Intermittent rash on the chest and arms. lotrisone cream helps - weekly.     Review of Systems  Comprehensive review of systems otherwise negative. See history/subjective section for more details.    Objective:      Physical Exam    /68 (BP Location: Left arm, Patient Position: Sitting, BP Method: Large (Manual))   Pulse 71   Ht 5' 9" (1.753 m)   Wt " 75.8 kg (167 lb 1.7 oz)   SpO2 98%   BMI 24.68 kg/m²     GEN - A+OX4, NAD   HEENT - PERRL, EOMI, OP clear. MMM.  Neck - No thyromegaly or cervical LAD. No thyroid masses felt.  CV - RRR, no m/r   Chest - CTAB, no wheezing or rhonchi  Abd - S/NT/ND/+BS.   Ext - palpable BDP, 2+ B PT and 2+ radial pulses. No LE edema.   Neuro - PERRL, EOMI, no nystagmus, eyebrow raise, facial sensation, hearing, m of mastication, smile, palatal raise, shoulder shrug, tongue protrusion symmetric and intact. 5/5 BUE and BLE strength. Normal gait. 2+ B DTRs.  Skin - No rash. Freckles.     Previous labs reviewed.    Assessment/Plan     Diagnoses and all orders for this visit:    Hyperlipidemia, unspecified hyperlipidemia type  -     atorvastatin (LIPITOR) 40 MG tablet; Take 1 tablet (40 mg total) by mouth once daily.  -     Lipid Panel; Future    Superior mesenteric artery stenosis  -     atorvastatin (LIPITOR) 40 MG tablet; Take 1 tablet (40 mg total) by mouth once daily.  -     CBC Auto Differential; Future  -     Comprehensive Metabolic Panel; Future  -     Lipid Panel; Future  -     Vascular Lab () US Mesenteric Arterial; Future    Dermatitis  -     clotrimazole-betamethasone 1-0.05% (LOTRISONE) cream; Apply topically 2 (two) times daily.    Aortic atherosclerosis  -     atorvastatin (LIPITOR) 40 MG tablet; Take 1 tablet (40 mg total) by mouth once daily.  -     Lipid Panel; Future    Elevated prostate specific antigen (PSA)  -     PROSTATE SPECIFIC ANTIGEN, DIAGNOSTIC; Future    Fatty liver - CMP and CBC.    Actinic keratosis  -     Ambulatory referral/consult to Dermatology; Future    Vitamin D deficiency  -     Vitamin D; Future    Advance Care Planning     Date: 08/05/2022    Living Will  During this visit, I engaged the patient  in the advance care planning process.  The patient and I reviewed the role for advance directives and their purpose in directing future healthcare if the patient's unable to speak for him/herself.  At  this point in time, the patient does have full decision-making capacity.  We discussed different extreme health states that he could experience, and reviewed what kind of medical care he would want in those situations.  The patient communicated that if he were comatose and had little chance of a meaningful recovery, he would not want machines/life-sustaining treatments used. In addition to the above preference, other important end-of-life issues for the patient include no PEG. The patient has completed a living will to reflect these preferences and The patient has already designated a healthcare power of  to make decisions on [unfilled] behalf.  I spent a total of 3 minutes engaging the patient in this advance care planning discussion.          Power of   I initiated the process of advance care planning today and explained the importance of this process to the patient.  I introduced the concept of advance directives to the patient, as well. Then the patient received detailed information about the importance of designating a Health Care Power of  (HCPOA). He was also instructed to communicate with this person about their wishes for future healthcare, should he become sick and lose decision-making capacity. The patient has previously appointed a HCPOA. After our discussion, the patient has decided to complete a HCPOA and has appointed his significant other, health care agent: wife & health care agent number: [phone] I spent a total time of 3 minutes discussing this issue with the patient.              Follow up in about 6 months (around 2/5/2023).      Elizabeth Bunch MD  Department of Internal Medicine - [facility]  7:50 AM

## 2022-08-15 ENCOUNTER — HOSPITAL ENCOUNTER (OUTPATIENT)
Dept: VASCULAR SURGERY | Facility: CLINIC | Age: 77
Discharge: HOME | End: 2022-08-15
Attending: INTERNAL MEDICINE
Payer: MEDICARE

## 2022-08-15 DIAGNOSIS — K55.1 SUPERIOR MESENTERIC ARTERY STENOSIS: ICD-10-CM

## 2022-08-15 PROCEDURE — 93975 VASCULAR STUDY: CPT | Mod: 26,S$PBB,, | Performed by: SURGERY

## 2022-08-15 PROCEDURE — 93975 VASCULAR STUDY: CPT | Mod: PBBFAC | Performed by: SURGERY

## 2022-08-15 PROCEDURE — 93975 PR DUPLEX ABD/PEL VASC STUDY,COMPLETE: ICD-10-PCS | Mod: 26,S$PBB,, | Performed by: SURGERY

## 2022-08-17 ENCOUNTER — TELEPHONE (OUTPATIENT)
Dept: PRIMARY CARE CLINIC | Facility: CLINIC | Age: 77
End: 2022-08-17
Payer: COMMERCIAL

## 2022-08-26 ENCOUNTER — TELEPHONE (OUTPATIENT)
Dept: SURGERY | Facility: CLINIC | Age: 77
End: 2022-08-26
Payer: COMMERCIAL

## 2022-08-26 NOTE — TELEPHONE ENCOUNTER
----- Message from Sasha Palumbo sent at 8/26/2022  1:43 PM CDT -----  Contact: @ [phone]  Pt is calling he is not to sure what he needs to see the doctor for but was told to make a appointment with a ABY. S due to a issue with his gut he said please call and adv @ [phone]

## 2022-09-08 ENCOUNTER — OFFICE VISIT (OUTPATIENT)
Dept: VASCULAR SURGERY | Facility: CLINIC | Age: 77
End: 2022-09-08
Attending: SURGERY
Payer: MEDICARE

## 2022-09-08 VITALS
WEIGHT: 165.38 LBS | HEART RATE: 74 BPM | SYSTOLIC BLOOD PRESSURE: 180 MMHG | DIASTOLIC BLOOD PRESSURE: 85 MMHG | TEMPERATURE: 98 F | BODY MASS INDEX: 24.5 KG/M2 | HEIGHT: 69 IN

## 2022-09-08 DIAGNOSIS — K55.1 SUPERIOR MESENTERIC ARTERY STENOSIS: Primary | ICD-10-CM

## 2022-09-08 PROCEDURE — 99214 OFFICE O/P EST MOD 30 MIN: CPT | Mod: S$PBB,,, | Performed by: SURGERY

## 2022-09-08 PROCEDURE — 99999 PR PBB SHADOW E&M-EST. PATIENT-LVL III: ICD-10-PCS | Mod: PBBFAC,,, | Performed by: SURGERY

## 2022-09-08 PROCEDURE — 99999 PR PBB SHADOW E&M-EST. PATIENT-LVL III: CPT | Mod: PBBFAC,,, | Performed by: SURGERY

## 2022-09-08 PROCEDURE — 99214 PR OFFICE/OUTPT VISIT, EST, LEVL IV, 30-39 MIN: ICD-10-PCS | Mod: S$PBB,,, | Performed by: SURGERY

## 2022-09-08 PROCEDURE — 99213 OFFICE O/P EST LOW 20 MIN: CPT | Mod: PBBFAC | Performed by: SURGERY

## 2022-09-08 NOTE — PROGRESS NOTES
Jessee MONROY Gracy  09/08/2022    HPI:  Patient is a 76 y.o. male with a h/o HLD, diverticulosis who is here today for evaluation of SMA stenosis. US shows a flow rate of 327 cm/s and a Ratio of 3.0. He is asymptomatic. Denies abdominal pain, diarrhea, nausea, vomiting, bloody stools. He had initially had a CT and US evaluation for mesenteric ischemia in 2015 when he had an episode of diverticulosis. Since then, has had no issues.       Interval (9/8/22) patient here for follow up of SMA stenosis. Patient denies abdominal symptoms, only states that his stomach gets upset if he has coffee on empty stomach. Denies abdominal cramps or sharp pains. Denies GI bleeding, vomiting, nausea, bloody stools. No new complaints, feeling well.     Denies MI/stroke  Tobacco use: Never  Stopped excessive drinking in 2015, now drinks socially  Does not exercise but is able to perform household tasks without feeling short of breath    Past Medical History:   Diagnosis Date    Allergy     Cataract     Diverticulosis     Hyperlipidemia     Joint pain     SMA stenosis     Steatosis of liver      Past Surgical History:   Procedure Laterality Date    APPENDECTOMY      CATARACT EXTRACTION  11/4/13    right & left eye    CATARACT EXTRACTION W/  INTRAOCULAR LENS IMPLANT  11/18/13    Left Eye/ Dr Gupta    COLONOSCOPY N/A 8/10/2016    Procedure: COLONOSCOPY;  Surgeon: Adolfo Moore MD;  Location: [address]);  Service: Endoscopy;  Laterality: N/A;  Patient has a history of Colon Polyps.    COLONOSCOPY N/A 12/23/2021    Procedure: COLONOSCOPY;  Surgeon: Jason Brian MD;  Location: [address]);  Service: Endoscopy;  Laterality: N/A;  Pt requesting 1st eli SINGLETON and December date/ fully vaccinated / prep ins. mailed - [facility]W     Family History   Problem Relation Age of Onset    Heart disease Father 66        MI    No Known Problems Brother     Cataracts Maternal Grandmother     Diabetes Maternal Grandmother     Cancer  Maternal Grandmother     Diabetes Paternal Grandmother     Glaucoma Neg Hx     Blindness Neg Hx     Melanoma Neg Hx      Social History     Socioeconomic History    Marital status:    Occupational History    Occupation: retired     Comment: used to do [employer]   Tobacco Use    Smoking status: Never    Smokeless tobacco: Never   Substance and Sexual Activity    Alcohol use: Yes     Comment: 1 beer every few months    Drug use: No       Current Outpatient Medications:     atorvastatin (LIPITOR) 40 MG tablet, Take 1 tablet (40 mg total) by mouth once daily., Disp: 90 tablet, Rfl: 3    clotrimazole-betamethasone 1-0.05% (LOTRISONE) cream, Apply topically 2 (two) times daily., Disp: 45 g, Rfl: 3    co-enzyme Q-10 30 mg capsule, Take 30 mg by mouth 3 (three) times daily., Disp: , Rfl:     aspirin (ECOTRIN) 81 MG EC tablet, Take 1 tablet (81 mg total) by mouth once daily., Disp: 360 tablet, Rfl: 0    REVIEW OF SYSTEMS:  General: negative; ENT: negative; Allergy and Immunology: negative; Hematological and Lymphatic: Negative; Endocrine: negative; Respiratory: no cough, shortness of breath, or wheezing; Cardiovascular: no chest pain or dyspnea on exertion; Gastrointestinal: no abdominal pain/back, change in bowel habits, or bloody stools; Genito-Urinary: no dysuria, trouble voiding, or hematuria; Musculoskeletal: negative  Neurological: no TIA or stroke symptoms; Psychiatric: no nervousness, anxiety or depression.    PHYSICAL EXAM:      Pulse: 74  Temp: 98.1 °F (36.7 °C)      General appearance:  Alert, well-appearing, and in no distress.  Oriented to person, place, and time   Neurological: Normal speech, no focal findings noted; CN II - XII grossly intact           Musculoskeletal: Digits/nail without cyanosis/clubbing.  Normal muscle strength/tone.                 Neck: Supple, no significant adenopathy; thyroid is not enlarged                Chest:  Clear to auscultation, no wheezes, rales or rhonchi, symmetric air  entry      No use of accessory muscles             Cardiac: Normal rate and regular rhythm, S1 and S2 normal; PMI non-displaced          Abdomen: Soft, nontender, depressible, non distended, no masses or organomegaly      No rebound tenderness noted; bowel sounds normal      No groin adenopathy      Extremities:   2+ femoral pulses bilaterally      pedal pulses palpable.      No pre-tibial edema      No ulcerations    LAB RESULTS:  Lab Results   Component Value Date    K 5.0 08/05/2022    K 4.5 10/26/2021    K 4.7 08/05/2020    CREATININE 1.0 08/05/2022    CREATININE 1.0 10/26/2021    CREATININE 1.2 08/05/2020     Lab Results   Component Value Date    WBC 7.51 08/05/2022    WBC 6.72 10/26/2021    WBC 9.77 08/05/2020    HCT 44.9 08/05/2022    HCT 43.4 10/26/2021    HCT 48.2 08/05/2020     08/05/2022     10/26/2021     (H) 08/05/2020     Lab Results   Component Value Date    HGBA1C 5.3 08/05/2020    HGBA1C 5.1 05/22/2018    HGBA1C 5.2 03/23/2017     IMAGING:  U/S Visceral Arterial Duplex (UPDATE) 8/15/2022  Indication   ========     SMA Stenosis     Aorta   =====     Aorta mid PS   117 cm/s   Aorta mid  ED          18 cm/s     Mesenteric Arteries   ===============     Celiac axis prox PSV   180 cm/s   Celiac axis prox EDV   33 cm/s   Celiac axis prox RI    0.81   Ratio (Celiac axis prox PSV / Aorta abd. PSV)  1.5   SMA prox PSV   379 cm/s   SMA prox EDV   17 cm/s   SMA prox RI    0.96   Ratio (SMA prox PSV / Aorta abd. PSV)  3.2   SMA mid PSV    178 cm/s   SMA mid EDV    13 cm/s   SMA mid RI 0.93   Ratio (SMA mid PSV / Aorta abd. PSV)   1.5   SMA distal  cm/s   SMA distal EDV 0 cm/s   SMA distal RI  1.00   Ratio (SMA distal PSV / Aorta abd. PSV)    1.0   ALKA prox PSV   213 cm/s   ALKA prox EDV   13 cm/s     Impression   =========     Duplex imaging reveals accelerated velocities and a focal narrowing at the Proximal SMA with a PSV of 379 cm/sec (previously 327   cm/sec). These findings  suggest a > 70% stenosis. The Aorta, Celiac and Inferior mesenteric arteries appear to be widely patent.       DATE OF SERVICE: 08/15/2022  Previous imaging    Indication   ========     -SMA stenosis     Aorta   =====     Aorta mid PS   109 cm/s   Aorta mid  ED          23 cm/s     Mesenteric Arteries   ===============     Celiac axis prox PSV   152 cm/s   Ratio (Celiac axis prox PSV / Aorta abd. PSV)  1.4   SMA prox PSV   327 cm/s   Ratio (SMA prox PSV / Aorta abd. PSV)  3.0   SMA mid PSV    203 cm/s   Ratio (SMA mid PSV / Aorta abd. PSV)   1.9   SMA distal  cm/s   Ratio (SMA distal PSV / Aorta abd. PSV)    1.2   ALKA prox PSV   140 cm/s   Ratio (ALKA prox PSV / Aorta abd. PSV)  1.3     Impression   =========     Duplex imaging reveals an accelerated velocity of 327 cm/s noted within the Proximal SMA with possible calcific plaque visualized.   This findings is suggestive of a focal stenosis. The Aorta, celiac and inferior mesenteric arteries appear to be widely patent without   stenosis.     IMP/PLAN:  76 y.o. male with h/o HLD, diverticulosis who is here today for follow up  of SMA stenosis. SMA  cm/s with a ratio of 3.0.  Denies weight loss, food fear, post-prandial pain.      - continue aspirin and statin  - Low suspicion for mesenteric ischemia given lack of symptoms  - CT shows widely patent SMA and celiac arteries  - return to clinic 24 mo with mesenteric duplex      STAFF ADDENDUM    I have reviewed the relevant data and the resident's assessment and agree with the findings and plan as outlined above.    Дмитрий Zarate MD  Vascular & Endovascular Surgery

## 2022-11-15 ENCOUNTER — OFFICE VISIT (OUTPATIENT)
Dept: DERMATOLOGY | Facility: CLINIC | Age: 77
End: 2022-11-15
Payer: MEDICARE

## 2022-11-15 DIAGNOSIS — L50.3 DERMATOGRAPHISM: ICD-10-CM

## 2022-11-15 DIAGNOSIS — L82.0 INFLAMED SEBORRHEIC KERATOSIS: Primary | ICD-10-CM

## 2022-11-15 PROCEDURE — 99999 PR PBB SHADOW E&M-EST. PATIENT-LVL IV: ICD-10-PCS | Mod: PBBFAC,,, | Performed by: DERMATOLOGY

## 2022-11-15 PROCEDURE — 99203 PR OFFICE/OUTPT VISIT, NEW, LEVL III, 30-44 MIN: ICD-10-PCS | Mod: 25,S$PBB,, | Performed by: DERMATOLOGY

## 2022-11-15 PROCEDURE — 99203 OFFICE O/P NEW LOW 30 MIN: CPT | Mod: 25,S$PBB,, | Performed by: DERMATOLOGY

## 2022-11-15 PROCEDURE — 17110 PR DESTRUCTION BENIGN LESIONS UP TO 14: ICD-10-PCS | Mod: S$PBB,,, | Performed by: DERMATOLOGY

## 2022-11-15 PROCEDURE — 99214 OFFICE O/P EST MOD 30 MIN: CPT | Mod: PBBFAC | Performed by: DERMATOLOGY

## 2022-11-15 PROCEDURE — 17110 DESTRUCTION B9 LES UP TO 14: CPT | Mod: S$PBB,,, | Performed by: DERMATOLOGY

## 2022-11-15 PROCEDURE — 99999 PR PBB SHADOW E&M-EST. PATIENT-LVL IV: CPT | Mod: PBBFAC,,, | Performed by: DERMATOLOGY

## 2022-11-15 PROCEDURE — 17110 DESTRUCTION B9 LES UP TO 14: CPT | Mod: PBBFAC | Performed by: DERMATOLOGY

## 2022-11-15 NOTE — PROGRESS NOTES
Subjective:       Patient ID:  Jessee Dubose is a 76 y.o. male who presents for   Chief Complaint   Patient presents with    Rash     Chest     Has had some type of rash x many years - treated with ointments    Rash - Initial  Affected locations: chest, torso, groin, right buttock, left buttock, abdomen, left upper leg and right upper leg  Duration: 4 months (comes and goes daily)  Signs / symptoms: itching  Severity: mild  Timing: intermittent  Aggravated by: nothing  Relieving factors/Treatments tried: anti-itch cream  Improvement on treatment: moderate      Review of Systems   Skin:  Positive for itching, rash and wears hat. Negative for daily sunscreen use, activity-related sunscreen use and recent sunburn.   Hematologic/Lymphatic: Bruises/bleeds easily.      Objective:    Physical Exam   Constitutional: He appears well-developed and well-nourished. No distress.   Neurological: He is alert and oriented to person, place, and time. He is not disoriented.   Psychiatric: He has a normal mood and affect.   Skin:   Areas Examined (abnormalities noted in diagram):   Scalp / Hair Palpated and Inspected  Head / Face Inspection Performed  Neck Inspection Performed  Chest / Axilla Inspection Performed  Abdomen Inspection Performed  Genitals / Buttocks / Groin Inspection Performed  Back Inspection Performed  RUE Inspected  LUE Inspection Performed  RLE Inspected  LLE Inspection Performed  Nails and Digits Inspection Performed            Diagram Legend     Erythematous scaling macule/papule c/w actinic keratosis       Vascular papule c/w angioma      Pigmented verrucoid papule/plaque c/w seborrheic keratosis      Yellow umbilicated papule c/w sebaceous hyperplasia      Irregularly shaped tan macule c/w lentigo     1-2 mm smooth white papules consistent with Milia      Movable subcutaneous cyst with punctum c/w epidermal inclusion cyst      Subcutaneous movable cyst c/w pilar cyst      Firm pink to brown papule c/w  dermatofibroma      Pedunculated fleshy papule(s) c/w skin tag(s)      Evenly pigmented macule c/w junctional nevus     Mildly variegated pigmented, slightly irregular-bordered macule c/w mildly atypical nevus      Flesh colored to evenly pigmented papule c/w intradermal nevus       Pink pearly papule/plaque c/w basal cell carcinoma      Erythematous hyperkeratotic cursted plaque c/w SCC      Surgical scar with no sign of skin cancer recurrence      Open and closed comedones      Inflammatory papules and pustules      Verrucoid papule consistent consistent with wart     Erythematous eczematous patches and plaques     Dystrophic onycholytic nail with subungual debris c/w onychomycosis     Umbilicated papule    Erythematous-base heme-crusted tan verrucoid plaque consistent with inflamed seborrheic keratosis     Erythematous Silvery Scaling Plaque c/w Psoriasis     See annotation                      Assessment / Plan:        Inflamed seborrheic keratosis - upper mid back  + bleeding  -     Ambulatory referral/consult to Dermatology  Procedure note for destruction via hyfrecation and curettage:    Verbal consent obtained. Risks of recurrence and scarring discussed.   1 lesions cleaned with alcohol and anesthetized with 1% lidocaine. Areas then lightly hyfrecated and curetted to remove gross lesion. Hemostasis achieved with aluminum chloride application. No complications.   Areas dressed with Vaseline jelly and bandage. Wound care discussed.      Dermatographism and urticaria (x few months per pt)  Start zyrtec 10mg every morning and take benadryl 25mg every night  -     Ambulatory referral/consult to Allergy; Future; Expected date: 11/22/2022           No follow-ups on file.

## 2022-11-15 NOTE — PATIENT INSTRUCTIONS
Start zyrtec 10mg every morning and take benadryl 25mg every night      Wound Care    A band aid and vaseline ointment has been placed over the site.  This should remain in place for 24 hours.  It is recommended that you keep the area dry for the first 24 hours.  After 24 hours, you may remove the band aid and wash the area with warm soap and water and apply Vaseline jelly.  Many patients prefer to use Neosporin or Bacitracin ointment.  This is acceptable; however, know that you can develop an allergy to this medication even if you have used it safely for years.  It is important to keep the area moist.  Letting it dry out and get air slows healing time, and will worsen the scar.  Band aid is optional after first 24 hours.      If you notice increasing redness, tenderness, pain, or yellow drainage at the site, please notify your doctor.  These are signs of an infection.    If your site is bleeding, apply firm pressure for 15 minutes straight.  Repeat for another 15 minutes, if it is still bleeding.   If the surgical site continues to bleed, then please contact your doctor.      [facility] [place] [address]/ [phone] FAX/ [email or web address]

## 2022-12-09 ENCOUNTER — OFFICE VISIT (OUTPATIENT)
Dept: ALLERGY | Facility: CLINIC | Age: 77
End: 2022-12-09
Payer: MEDICARE

## 2022-12-09 ENCOUNTER — LAB VISIT (OUTPATIENT)
Dept: LAB | Facility: HOSPITAL | Age: 77
End: 2022-12-09
Payer: MEDICARE

## 2022-12-09 VITALS — BODY MASS INDEX: 24.82 KG/M2 | HEIGHT: 69 IN | HEART RATE: 61 BPM | WEIGHT: 167.56 LBS | OXYGEN SATURATION: 98 %

## 2022-12-09 DIAGNOSIS — L50.3 DERMATOGRAPHISM: ICD-10-CM

## 2022-12-09 DIAGNOSIS — L50.8 CHRONIC URTICARIA: Primary | ICD-10-CM

## 2022-12-09 DIAGNOSIS — L50.8 CHRONIC URTICARIA: ICD-10-CM

## 2022-12-09 DIAGNOSIS — L29.9 PRURITUS: ICD-10-CM

## 2022-12-09 LAB
ALBUMIN SERPL BCP-MCNC: 4.6 G/DL (ref 3.5–5.2)
ALP SERPL-CCNC: 79 U/L (ref 55–135)
ALT SERPL W/O P-5'-P-CCNC: 29 U/L (ref 10–44)
ANION GAP SERPL CALC-SCNC: 12 MMOL/L (ref 8–16)
AST SERPL-CCNC: 27 U/L (ref 10–40)
BASOPHILS # BLD AUTO: 0.07 K/UL (ref 0–0.2)
BASOPHILS NFR BLD: 0.9 % (ref 0–1.9)
BILIRUB SERPL-MCNC: 1.3 MG/DL (ref 0.1–1)
BUN SERPL-MCNC: 15 MG/DL (ref 8–23)
CALCIUM SERPL-MCNC: 10 MG/DL (ref 8.7–10.5)
CHLORIDE SERPL-SCNC: 105 MMOL/L (ref 95–110)
CO2 SERPL-SCNC: 25 MMOL/L (ref 23–29)
CREAT SERPL-MCNC: 0.9 MG/DL (ref 0.5–1.4)
DIFFERENTIAL METHOD: ABNORMAL
EOSINOPHIL # BLD AUTO: 0.6 K/UL (ref 0–0.5)
EOSINOPHIL NFR BLD: 8 % (ref 0–8)
ERYTHROCYTE [DISTWIDTH] IN BLOOD BY AUTOMATED COUNT: 13.3 % (ref 11.5–14.5)
EST. GFR  (NO RACE VARIABLE): >60 ML/MIN/1.73 M^2
GLUCOSE SERPL-MCNC: 89 MG/DL (ref 70–110)
HCT VFR BLD AUTO: 48.4 % (ref 40–54)
HGB BLD-MCNC: 16.4 G/DL (ref 14–18)
IMM GRANULOCYTES # BLD AUTO: 0.02 K/UL (ref 0–0.04)
IMM GRANULOCYTES NFR BLD AUTO: 0.3 % (ref 0–0.5)
LYMPHOCYTES # BLD AUTO: 1.3 K/UL (ref 1–4.8)
LYMPHOCYTES NFR BLD: 16.1 % (ref 18–48)
MCH RBC QN AUTO: 31.8 PG (ref 27–31)
MCHC RBC AUTO-ENTMCNC: 33.9 G/DL (ref 32–36)
MCV RBC AUTO: 94 FL (ref 82–98)
MONOCYTES # BLD AUTO: 0.6 K/UL (ref 0.3–1)
MONOCYTES NFR BLD: 7.1 % (ref 4–15)
NEUTROPHILS # BLD AUTO: 5.3 K/UL (ref 1.8–7.7)
NEUTROPHILS NFR BLD: 67.6 % (ref 38–73)
NRBC BLD-RTO: 0 /100 WBC
PLATELET # BLD AUTO: 317 K/UL (ref 150–450)
PMV BLD AUTO: 9.8 FL (ref 9.2–12.9)
POTASSIUM SERPL-SCNC: 3.9 MMOL/L (ref 3.5–5.1)
PROT SERPL-MCNC: 8 G/DL (ref 6–8.4)
RBC # BLD AUTO: 5.15 M/UL (ref 4.6–6.2)
SODIUM SERPL-SCNC: 142 MMOL/L (ref 136–145)
TSH SERPL DL<=0.005 MIU/L-ACNC: 1.88 UIU/ML (ref 0.4–4)
WBC # BLD AUTO: 7.84 K/UL (ref 3.9–12.7)

## 2022-12-09 PROCEDURE — 36415 COLL VENOUS BLD VENIPUNCTURE: CPT | Mod: PO | Performed by: ALLERGY & IMMUNOLOGY

## 2022-12-09 PROCEDURE — 99999 PR PBB SHADOW E&M-EST. PATIENT-LVL IV: ICD-10-PCS | Mod: PBBFAC,,, | Performed by: ALLERGY & IMMUNOLOGY

## 2022-12-09 PROCEDURE — 99204 OFFICE O/P NEW MOD 45 MIN: CPT | Mod: S$PBB,,, | Performed by: ALLERGY & IMMUNOLOGY

## 2022-12-09 PROCEDURE — 99204 PR OFFICE/OUTPT VISIT, NEW, LEVL IV, 45-59 MIN: ICD-10-PCS | Mod: S$PBB,,, | Performed by: ALLERGY & IMMUNOLOGY

## 2022-12-09 PROCEDURE — 86003 ALLG SPEC IGE CRUDE XTRC EA: CPT | Mod: 59 | Performed by: ALLERGY & IMMUNOLOGY

## 2022-12-09 PROCEDURE — 84165 PROTEIN E-PHORESIS SERUM: CPT | Performed by: ALLERGY & IMMUNOLOGY

## 2022-12-09 PROCEDURE — 86376 MICROSOMAL ANTIBODY EACH: CPT | Performed by: ALLERGY & IMMUNOLOGY

## 2022-12-09 PROCEDURE — 99999 PR PBB SHADOW E&M-EST. PATIENT-LVL IV: CPT | Mod: PBBFAC,,, | Performed by: ALLERGY & IMMUNOLOGY

## 2022-12-09 PROCEDURE — 84443 ASSAY THYROID STIM HORMONE: CPT | Performed by: ALLERGY & IMMUNOLOGY

## 2022-12-09 PROCEDURE — 84165 PATHOLOGIST INTERPRETATION SPE: ICD-10-PCS | Mod: 26,,, | Performed by: PATHOLOGY

## 2022-12-09 PROCEDURE — 84445 ASSAY OF TSI GLOBULIN: CPT | Performed by: ALLERGY & IMMUNOLOGY

## 2022-12-09 PROCEDURE — 86003 ALLG SPEC IGE CRUDE XTRC EA: CPT | Performed by: ALLERGY & IMMUNOLOGY

## 2022-12-09 PROCEDURE — 85025 COMPLETE CBC W/AUTO DIFF WBC: CPT | Performed by: ALLERGY & IMMUNOLOGY

## 2022-12-09 PROCEDURE — 80053 COMPREHEN METABOLIC PANEL: CPT | Performed by: ALLERGY & IMMUNOLOGY

## 2022-12-09 PROCEDURE — 99214 OFFICE O/P EST MOD 30 MIN: CPT | Mod: PBBFAC,PO | Performed by: ALLERGY & IMMUNOLOGY

## 2022-12-09 PROCEDURE — 88184 FLOWCYTOMETRY/ TC 1 MARKER: CPT | Performed by: ALLERGY & IMMUNOLOGY

## 2022-12-09 PROCEDURE — 84165 PROTEIN E-PHORESIS SERUM: CPT | Mod: 26,,, | Performed by: PATHOLOGY

## 2022-12-09 NOTE — PROGRESS NOTES
"Subjective:       Patient ID: Jessee Dubose is a 77 y.o. male.    Chief Complaint:  Urticaria and Itching      76 yo man presents for new patient evaluation of hives and itching. He states for few months he gets red raised spots which itch. He puts cream on it and goes away in about an hour. No bryce left. Pictures from visit with derm C/w urticaria. No angioedema. Does not bother him too much as the cream helps. He has no known food, insect or latex allergy. Does have "Hayfever" with sneeze, runny nose, PND and takes some [employer] OTC allergy med prn. No asthma. No eczema. Has high cholesterol on statin and baby ASA but no other medical issues       Environmental History: see history section for home environment  Review of Systems   Constitutional:  Negative for activity change, appetite change, chills, fatigue, fever and unexpected weight change.   HENT:  Positive for congestion, postnasal drip, rhinorrhea and sneezing. Negative for ear discharge, ear pain, facial swelling, hearing loss, mouth sores, nosebleeds, sinus pressure, sore throat, tinnitus, trouble swallowing and voice change.    Eyes:  Negative for discharge, redness, itching and visual disturbance.   Respiratory:  Negative for cough, chest tightness, shortness of breath and wheezing.    Cardiovascular:  Negative for chest pain, palpitations and leg swelling.   Gastrointestinal:  Negative for abdominal distention, abdominal pain, constipation, diarrhea, nausea and vomiting.   Genitourinary:  Negative for difficulty urinating.   Musculoskeletal:  Positive for arthralgias. Negative for back pain, joint swelling and myalgias.   Skin:  Positive for color change and rash. Negative for pallor.   Neurological:  Negative for dizziness, tremors, speech difficulty, weakness, light-headedness and headaches.   Hematological:  Negative for adenopathy. Does not bruise/bleed easily.   Psychiatric/Behavioral:  Negative for agitation, confusion, decreased " concentration and sleep disturbance. The patient is not nervous/anxious.       Objective:      Physical Exam  Vitals and nursing note reviewed.   Constitutional:       General: He is not in acute distress.     Appearance: Normal appearance. He is not ill-appearing.   HENT:      Head: Normocephalic and atraumatic.      Right Ear: External ear normal.      Left Ear: External ear normal.      Nose: No rhinorrhea.   Eyes:      General:         Right eye: No discharge.         Left eye: No discharge.      Conjunctiva/sclera: Conjunctivae normal.   Cardiovascular:      Rate and Rhythm: Normal rate and regular rhythm.   Pulmonary:      Effort: Pulmonary effort is normal. No respiratory distress.   Abdominal:      General: There is no distension.   Musculoskeletal:         General: Normal range of motion.      Cervical back: Normal range of motion.   Skin:     General: Skin is warm and dry.      Findings: No erythema or rash.   Neurological:      Mental Status: He is alert and oriented to person, place, and time.   Psychiatric:         Mood and Affect: Mood normal.         Behavior: Behavior normal.         Thought Content: Thought content normal.         Judgment: Judgment normal.       Laboratory:   none performed   Assessment:       1. Chronic urticaria    2. Dermatographism    3. Pruritus         Plan:       Advised hives can be allergic vs systemic vs immune, ward libby labs to eval  Start cetirizine 10 or levocetirizine 5 mg BID  Phone review

## 2022-12-12 LAB
A ALTERNATA IGE QN: <0.1 KU/L
A FUMIGATUS IGE QN: <0.1 KU/L
ALBUMIN SERPL ELPH-MCNC: 4.25 G/DL (ref 3.35–5.55)
ALPHA1 GLOB SERPL ELPH-MCNC: 0.22 G/DL (ref 0.17–0.41)
ALPHA2 GLOB SERPL ELPH-MCNC: 0.54 G/DL (ref 0.43–0.99)
B-GLOBULIN SERPL ELPH-MCNC: 0.78 G/DL (ref 0.5–1.1)
BAHIA GRASS IGE QN: <0.1 KU/L
BALD CYPRESS IGE QN: <0.1 KU/L
CAT DANDER IGE QN: <0.1 KU/L
COMMON RAGWEED IGE QN: <0.1 KU/L
COW MILK IGE QN: <0.1 KU/L
D FARINAE IGE QN: 0.5 KU/L
D PTERONYSS IGE QN: 0.53 KU/L
DEPRECATED A ALTERNATA IGE RAST QL: NORMAL
DEPRECATED A FUMIGATUS IGE RAST QL: NORMAL
DEPRECATED BAHIA GRASS IGE RAST QL: NORMAL
DEPRECATED BALD CYPRESS IGE RAST QL: NORMAL
DEPRECATED CAT DANDER IGE RAST QL: NORMAL
DEPRECATED COMMON RAGWEED IGE RAST QL: NORMAL
DEPRECATED COW MILK IGE RAST QL: NORMAL
DEPRECATED D FARINAE IGE RAST QL: ABNORMAL
DEPRECATED D PTERONYSS IGE RAST QL: ABNORMAL
DEPRECATED DOG DANDER IGE RAST QL: NORMAL
DEPRECATED EGG WHITE IGE RAST QL: ABNORMAL
DEPRECATED ELDER IGE RAST QL: NORMAL
DEPRECATED OAT IGE RAST QL: NORMAL
DEPRECATED P NOTATUM IGE RAST QL: NORMAL
DEPRECATED PECAN/HICK TREE IGE RAST QL: NORMAL
DEPRECATED ROACH IGE RAST QL: NORMAL
DEPRECATED S ROSTRATA IGE RAST QL: NORMAL
DEPRECATED SOYBEAN IGE RAST QL: NORMAL
DEPRECATED TIMOTHY IGE RAST QL: NORMAL
DEPRECATED WHEAT IGE RAST QL: NORMAL
DEPRECATED WHITE OAK IGE RAST QL: NORMAL
DOG DANDER IGE QN: <0.1 KU/L
EGG WHITE IGE QN: 0.14 KU/L
ELDER IGE QN: <0.1 KU/L
GAMMA GLOB SERPL ELPH-MCNC: 1.2 G/DL (ref 0.67–1.58)
OAT IGE QN: <0.1 KU/L
P NOTATUM IGE QN: <0.1 KU/L
PECAN/HICK TREE IGE QN: <0.1 KU/L
PROT SERPL-MCNC: 7 G/DL (ref 6–8.4)
ROACH IGE QN: <0.1 KU/L
S ROSTRATA IGE QN: <0.1 KU/L
SOYBEAN IGE QN: <0.1 KU/L
THYROPEROXIDASE IGG SERPL-ACNC: <6 IU/ML
TIMOTHY IGE QN: <0.1 KU/L
TSI SER-ACNC: <0.1 IU/L
WHEAT IGE QN: <0.1 KU/L
WHITE OAK IGE QN: <0.1 KU/L

## 2022-12-15 LAB — PATHOLOGIST INTERPRETATION SPE: NORMAL

## 2022-12-23 LAB
BASOPHILS %, CD203C: 2.1 % (ref 0–12)
IGE RECEPTOR AB INTERPRETATION:: NORMAL

## 2022-12-29 ENCOUNTER — PATIENT MESSAGE (OUTPATIENT)
Dept: ALLERGY | Facility: CLINIC | Age: 77
End: 2022-12-29
Payer: COMMERCIAL

## 2023-02-03 DIAGNOSIS — I70.0 AORTIC ATHEROSCLEROSIS: ICD-10-CM

## 2023-02-03 DIAGNOSIS — K55.1 SUPERIOR MESENTERIC ARTERY STENOSIS: ICD-10-CM

## 2023-02-03 DIAGNOSIS — E78.5 HYPERLIPIDEMIA, UNSPECIFIED HYPERLIPIDEMIA TYPE: ICD-10-CM

## 2023-02-03 RX ORDER — ATORVASTATIN CALCIUM 40 MG/1
40 TABLET, FILM COATED ORAL DAILY
Qty: 90 TABLET | Refills: 3 | Status: SHIPPED | OUTPATIENT
Start: 2023-02-03 | End: 2023-02-06 | Stop reason: SDUPTHER

## 2023-02-06 DIAGNOSIS — I70.0 AORTIC ATHEROSCLEROSIS: ICD-10-CM

## 2023-02-06 DIAGNOSIS — E78.5 HYPERLIPIDEMIA, UNSPECIFIED HYPERLIPIDEMIA TYPE: ICD-10-CM

## 2023-02-06 DIAGNOSIS — K55.1 SUPERIOR MESENTERIC ARTERY STENOSIS: ICD-10-CM

## 2023-02-06 RX ORDER — ATORVASTATIN CALCIUM 40 MG/1
40 TABLET, FILM COATED ORAL DAILY
Qty: 90 TABLET | Refills: 3 | Status: SHIPPED | OUTPATIENT
Start: 2023-02-06 | End: 2024-02-08 | Stop reason: SDUPTHER

## 2023-02-06 NOTE — TELEPHONE ENCOUNTER
----- Message from Lise Winchester sent at 2/6/2023  1:14 PM CST -----  Contact: Pt [phone]  Requesting an RX refill or new RX.  Is this a refill or new RX: Refill   RX name and strength (copy/paste from chart):  atorvastatin (LIPITOR) 40 MG tablet  Is this a 30 day or 90 day RX: 90  Pharmacy name and phone # (copy/paste from chart):    [facility] - [place] - [facility] AT [address] to [facility]  [place] [address]  Phone: [phone] Fax: [phone]

## 2023-03-21 ENCOUNTER — OFFICE VISIT (OUTPATIENT)
Dept: PRIMARY CARE CLINIC | Facility: CLINIC | Age: 78
End: 2023-03-21
Payer: MEDICARE

## 2023-03-21 VITALS
SYSTOLIC BLOOD PRESSURE: 138 MMHG | BODY MASS INDEX: 25.34 KG/M2 | WEIGHT: 171.06 LBS | DIASTOLIC BLOOD PRESSURE: 57 MMHG | HEART RATE: 56 BPM | HEIGHT: 69 IN | OXYGEN SATURATION: 99 % | RESPIRATION RATE: 18 BRPM

## 2023-03-21 DIAGNOSIS — E78.5 HYPERLIPIDEMIA, UNSPECIFIED HYPERLIPIDEMIA TYPE: ICD-10-CM

## 2023-03-21 DIAGNOSIS — K55.1 SUPERIOR MESENTERIC ARTERY STENOSIS: ICD-10-CM

## 2023-03-21 DIAGNOSIS — I70.0 AORTIC ATHEROSCLEROSIS: ICD-10-CM

## 2023-03-21 DIAGNOSIS — L50.8 CHRONIC URTICARIA: Primary | ICD-10-CM

## 2023-03-21 DIAGNOSIS — L30.9 DERMATITIS: ICD-10-CM

## 2023-03-21 PROCEDURE — 99999 PR PBB SHADOW E&M-EST. PATIENT-LVL III: ICD-10-PCS | Mod: PBBFAC,,, | Performed by: INTERNAL MEDICINE

## 2023-03-21 PROCEDURE — 99213 OFFICE O/P EST LOW 20 MIN: CPT | Mod: PBBFAC,PN | Performed by: INTERNAL MEDICINE

## 2023-03-21 PROCEDURE — 99214 OFFICE O/P EST MOD 30 MIN: CPT | Mod: S$PBB,,, | Performed by: INTERNAL MEDICINE

## 2023-03-21 PROCEDURE — 99214 PR OFFICE/OUTPT VISIT, EST, LEVL IV, 30-39 MIN: ICD-10-PCS | Mod: S$PBB,,, | Performed by: INTERNAL MEDICINE

## 2023-03-21 PROCEDURE — 99999 PR PBB SHADOW E&M-EST. PATIENT-LVL III: CPT | Mod: PBBFAC,,, | Performed by: INTERNAL MEDICINE

## 2023-03-21 RX ORDER — CLOTRIMAZOLE AND BETAMETHASONE DIPROPIONATE 10; .64 MG/G; MG/G
CREAM TOPICAL 2 TIMES DAILY
Qty: 45 G | Refills: 3 | Status: SHIPPED | OUTPATIENT
Start: 2023-03-21 | End: 2023-03-21 | Stop reason: SDUPTHER

## 2023-03-21 RX ORDER — CETIRIZINE HYDROCHLORIDE 10 MG/1
10 TABLET ORAL DAILY
Qty: 90 TABLET | Refills: 3 | Status: SHIPPED | OUTPATIENT
Start: 2023-03-21 | End: 2024-03-20

## 2023-03-21 RX ORDER — ASPIRIN 81 MG/1
81 TABLET ORAL DAILY
Qty: 360 TABLET | Refills: 0 | COMMUNITY
Start: 2023-03-21 | End: 2024-03-20

## 2023-03-21 RX ORDER — CLOTRIMAZOLE AND BETAMETHASONE DIPROPIONATE 10; .64 MG/G; MG/G
CREAM TOPICAL 2 TIMES DAILY
Qty: 45 G | Refills: 3 | Status: SHIPPED | OUTPATIENT
Start: 2023-03-21

## 2023-03-21 NOTE — PROGRESS NOTES
Subjective:       Patient ID: Jessee Dubose is a 77 y.o. male.    Chief Complaint: urticaria    HPI    Cscope - 12/23/21 - normal. 2mm polyp. Diverticulosis. Internal hemorrhoids. Repeat in 5 yrs. Dr. Brian.  Lives w/ wife. No assistance w/ ADLs - cuts the grass; wife does most of the housework just because. Does not drive. Wife drives him around. Watches TV most of the time and reads. Not a lot of exercising. Wife has exercise bike.   Had 2 COVID vaccines. Declines flu vaccine.  Weight gain of about 8lbs.   Reviewed ACP.      HLD - atorvastatin 40mg daily.  CT A/P 1/13/22 - moderate aortoiliac calcific atherosclerosis.  LDL - 8/5/22 61.4  Doesn't eat a lot of meats.      Elevated PSA. 10/26/21 5.5-->8/5/22 6.6  H/o benign TURP 5/13/16 w/ Dr. Grier.  LOV w/ Dr. Grier 9/28/20.   No issues w/ urination. no hesitation/dysuria/frequency.      H/o colitis in 2015. Mesenteric US w/ stenosis. Seen Dr. VAZQUEZ in 2015. Asymptomatic. No issues w/ abdominal pain. No bloody stools. Does have constipation - resolves metamucil. No nausea/vomiting.  Saw Dr. Zarate 9/8/22 - low suspicion for mesenteric ischemia given lack of symptoms. Return in 24 mo w/ mesenteric US. Cont stat and asa.     Fatty liver. Cholelithiasis. Last CMP in Dec w/ mildly elevated T bili at 1.3.   Used be quite an alcohol drinker but only drinks occasionally now when he goes out to eat.     Rash - saw Dr. Mukherjee 11/15/22 - inflamed seborrheic keratosis of the upper mid back and dermatographia and urticaria - started on zyrtec 10mg qam and referred to allergy.  Saw Dr. Scott 12/2022 - started on zyrtec 10mg BID for chronic urticaria.  Lots of allergies labs reviewed - IgE elevated for egg white, D farinae, dust mite   Reports may take zyrtec 10mg once a day or every other day. Works when he takes it.   Reports dust in the house. But thinking about getting air purifier.   No trouble w/ swallowing or dysphagia.     Review of Systems  "  Constitutional:  Negative for chills, fever and unexpected weight change.   HENT:  Negative for congestion, postnasal drip and rhinorrhea.    Eyes:  Negative for visual disturbance (wears reading glasses. due for eye exam.).   Respiratory:  Negative for cough, shortness of breath and wheezing.    Cardiovascular:  Negative for chest pain, palpitations and leg swelling.   Gastrointestinal:  Positive for blood in stool (only when he has a very hard stool. UTD on C scope.) and constipation. Negative for abdominal pain, diarrhea, nausea and vomiting.   Genitourinary:  Negative for dysuria and hematuria.   Musculoskeletal:  Negative for arthralgias, back pain and joint swelling.   Skin:  Positive for rash.   Neurological:  Negative for dizziness, light-headedness, numbness and headaches.        No falls.    Psychiatric/Behavioral:  Negative for dysphoric mood. The patient is not nervous/anxious.    Comprehensive review of systems otherwise negative. See history/subjective section for more details.    Objective:      Physical Exam    BP (!) 138/57 (BP Location: Left arm, Patient Position: Sitting, BP Method: Medium (Manual))   Pulse (!) 56   Resp 18   Ht 5' 9.02" (1.753 m)   Wt 77.6 kg (171 lb 1.2 oz)   SpO2 99%   BMI 25.25 kg/m²     GEN - A+OX4, NAD   HEENT - PERRL, EOMI, OP clear. MMM.   Neck - No thyromegaly or cervical LAD. No thyroid masses felt.  CV - RRR, no m/r   Chest - CTAB, no wheezing or rhonchi  Abd - S/NT/ND/+BS.   Ext - 2+BDP and radial pulses. No C/C/E.  Neuro - PERRL, EOMI, no nystagmus, eyebrow raise, facial sensation, hearing, m of mastication, smile, palatal raise, shoulder shrug, tongue protrusion symmetric and intact. 5/5 BUE and BLE strength. Normal gait. 2+ DTRs.   MSK - no spinalt enderness to palpation.   Skin - some excoriations of BUE.     Previous labs reviewed.      Assessment/Plan     Diagnoses and all orders for this visit:    Chronic urticaria - went over results of allergy " testing.  -     cetirizine (ZYRTEC) 10 MG tablet; Take 1 tablet (10 mg total) by mouth once daily.  -     Discontinue: clotrimazole-betamethasone 1-0.05% (LOTRISONE) cream; Apply topically 2 (two) times daily.  -     clotrimazole-betamethasone 1-0.05% (LOTRISONE) cream; Apply topically 2 (two) times daily.    Hyperlipidemia, unspecified hyperlipidemia type - cont atorva and asa.   -     aspirin (ECOTRIN) 81 MG EC tablet; Take 1 tablet (81 mg total) by mouth once daily.    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 Asymptomatic.   -     aspirin (ECOTRIN) 81 MG EC tablet; Take 1 tablet (81 mg total) by mouth once daily.    Aortic atherosclerosis - cont atorva and asa.  -     aspirin (ECOTRIN) 81 MG EC tablet; Take 1 tablet (81 mg total) by mouth once daily.    Dermatitis  -     Discontinue: clotrimazole-betamethasone 1-0.05% (LOTRISONE) cream; Apply topically 2 (two) times daily.  -     clotrimazole-betamethasone 1-0.05% (LOTRISONE) cream; Apply topically 2 (two) times daily.    Advance Care Planning     Date: 03/21/2023    Living Will  Reviewed living will and HCPOA on file. Pt wishes no changes to be made.      Power of   Reviewed living will and HCPOA on file. Pt wishes no changes to be made.       Follow up in about 1 year (around 3/21/2024).      Elizabeth Bunch MD  Department of Internal Medicine - [facility]  9:48 AM

## 2024-02-08 DIAGNOSIS — K55.1 SUPERIOR MESENTERIC ARTERY STENOSIS: ICD-10-CM

## 2024-02-08 DIAGNOSIS — E78.5 HYPERLIPIDEMIA, UNSPECIFIED HYPERLIPIDEMIA TYPE: ICD-10-CM

## 2024-02-08 DIAGNOSIS — I70.0 AORTIC ATHEROSCLEROSIS: ICD-10-CM

## 2024-02-08 NOTE — TELEPHONE ENCOUNTER
----- Message from Kieran Fontenot sent at 2/8/2024  3:51 PM CST -----  Contact: [phone]  Requesting an RX refill or new RX.  Is this a refill or new RX: refill  RX name and strength (copy/paste from chart):  atorvastatin (LIPITOR) 40 MG tablet   Is this a 30 day or 90 day RX: 90  Pharmacy name and phone # (copy/paste from chart):    [facility] - [place] - [facility] AT [address] to [facility]  [place] [address]  Phone: [phone] Fax: [phone]      The doctors have asked that we provide their patients with the following 2 reminders -- prescription refills can take up to 72 hours, and a friendly reminder that in the future you can use your [facility] account to request refills: yes

## 2024-02-09 RX ORDER — ATORVASTATIN CALCIUM 40 MG/1
40 TABLET, FILM COATED ORAL DAILY
Qty: 90 TABLET | Refills: 3 | Status: SHIPPED | OUTPATIENT
Start: 2024-02-09 | End: 2024-05-06 | Stop reason: SDUPTHER

## 2024-02-27 DIAGNOSIS — Z00.00 ENCOUNTER FOR MEDICARE ANNUAL WELLNESS EXAM: ICD-10-CM

## 2024-04-22 ENCOUNTER — OFFICE VISIT (OUTPATIENT)
Dept: INTERNAL MEDICINE | Facility: CLINIC | Age: 79
End: 2024-04-22
Payer: MEDICARE

## 2024-04-22 VITALS
WEIGHT: 172.19 LBS | BODY MASS INDEX: 25.5 KG/M2 | SYSTOLIC BLOOD PRESSURE: 120 MMHG | HEIGHT: 69 IN | OXYGEN SATURATION: 99 % | DIASTOLIC BLOOD PRESSURE: 84 MMHG | HEART RATE: 55 BPM

## 2024-04-22 DIAGNOSIS — N13.8 BPH WITH URINARY OBSTRUCTION: ICD-10-CM

## 2024-04-22 DIAGNOSIS — E78.5 HYPERLIPIDEMIA, UNSPECIFIED HYPERLIPIDEMIA TYPE: ICD-10-CM

## 2024-04-22 DIAGNOSIS — I70.0 AORTIC ATHEROSCLEROSIS: ICD-10-CM

## 2024-04-22 DIAGNOSIS — K76.0 FATTY LIVER: ICD-10-CM

## 2024-04-22 DIAGNOSIS — I10 PRIMARY HYPERTENSION: ICD-10-CM

## 2024-04-22 DIAGNOSIS — Z00.00 ENCOUNTER FOR PREVENTIVE HEALTH EXAMINATION: Primary | ICD-10-CM

## 2024-04-22 DIAGNOSIS — Z00.00 ENCOUNTER FOR MEDICARE ANNUAL WELLNESS EXAM: ICD-10-CM

## 2024-04-22 DIAGNOSIS — K55.1 SUPERIOR MESENTERIC ARTERY STENOSIS: ICD-10-CM

## 2024-04-22 DIAGNOSIS — N40.1 BPH WITH URINARY OBSTRUCTION: ICD-10-CM

## 2024-04-22 PROCEDURE — 99214 OFFICE O/P EST MOD 30 MIN: CPT | Mod: PBBFAC | Performed by: NURSE PRACTITIONER

## 2024-04-22 PROCEDURE — 99999 PR PBB SHADOW E&M-EST. PATIENT-LVL IV: CPT | Mod: PBBFAC,,, | Performed by: NURSE PRACTITIONER

## 2024-04-22 PROCEDURE — G0439 PPPS, SUBSEQ VISIT: HCPCS | Mod: ,,, | Performed by: NURSE PRACTITIONER

## 2024-04-22 NOTE — PROGRESS NOTES
"  Jessee Dubose presented for an initial Medicare AWV today. The following components were reviewed and updated:    Medical history  Family History  Social history  Allergies and Current Medications  Health Risk Assessment  Health Maintenance  Care Team    **See Completed Assessments for Annual Wellness visit with in the encounter summary    The following assessments were completed:  Depression Screening  Cognitive function Screening    Timed Get Up Test  Whisper Test      Opioid documentation:      Patient does not have a current opioid prescription.          Vitals:    04/22/24 0759 04/22/24 0811   BP:  120/84   Pulse:  (!) 55   SpO2:  99%   Weight: 78.1 kg (172 lb 2.9 oz)    Height: 5' 9" (1.753 m)      Body mass index is 25.43 kg/m².       Physical Exam  Vitals and nursing note reviewed.   Constitutional:       Appearance: He is well-developed.   HENT:      Head: Normocephalic.   Cardiovascular:      Rate and Rhythm: Normal rate and regular rhythm.      Heart sounds: Normal heart sounds. No murmur heard.  Pulmonary:      Effort: Pulmonary effort is normal.      Breath sounds: Normal breath sounds.   Abdominal:      General: Bowel sounds are normal.      Palpations: Abdomen is soft.   Musculoskeletal:         General: Normal range of motion.   Skin:     General: Skin is warm and dry.   Neurological:      Mental Status: He is alert and oriented to person, place, and time.      Motor: No abnormal muscle tone.            Diagnoses and health risks identified today and associated recommendations/orders:    1. Encounter for preventive health examination  Here for Health Risk Assessment/Annual Wellness Visit.  Health maintenance reviewed and updated. Follow up in one year.     2. Primary hypertension  Chronic, at goal without scheduled medication. Followed by PCP.     3. Aortic atherosclerosis  Chronic, stable on current medications. Noted CT Abdomen/Pelvis 1/12/22. Followed by PCP.     4. Hyperlipidemia, unspecified " hyperlipidemia type  Chronic, stable on current medications. Lipid panel per PCP. Followed by PCP.     5. BPH with urinary obstruction  Chronic, stable. No C/O frequency/incontinence. No scheduled medication. Followed by PCP.     6. SMA stenosis  Chronic, stable on current medications. Followed by PCP.     7. Fatty liver  Chronic, stable. Followed by PCP.     8. Encounter for Medicare annual wellness exam  - Ambulatory Referral/Consult to Enhanced Annual Wellness Visit (eAWV)      Provided Jessee with a 5-10 year written screening schedule and personal prevention plan. Recommendations were developed using the USPSTF age appropriate recommendations. Education, counseling, and referrals were provided as needed.  After Visit Summary printed and given to patient which includes a list of additional screenings\tests needed.    Follow up in about 11 months (around 3/21/2025).with PCP      Latha Kaur NP

## 2024-04-22 NOTE — PATIENT INSTRUCTIONS
Counseling and Referral of Other Preventative  (Italic type indicates deductible and co-insurance are waived)    Patient Name: Jessee Dubose  Today's Date: 4/22/2024    Health Maintenance       Date Due Completion Date    RSV Vaccine (Age 60+ and Pregnant patients) (1 - 1-dose 60+ series) Never done ---    Lipid Panel 08/05/2023 8/5/2022    COVID-19 Vaccine (3 - 2023-24 season) 09/01/2023 5/7/2021    Influenza Vaccine (1) 06/30/2024 (Originally 9/1/2023) ---    TETANUS VACCINE 08/23/2028 8/23/2018    Override on 1/1/2011: Done        No orders of the defined types were placed in this encounter.      The following information is provided to all patients.  This information is to help you find resources for any of the problems found today that may be affecting your health:                  Living healthy guide: www.[facility].[place].gov      Understanding Diabetes: www.diabetes.org      Eating healthy: www.cdc.gov/healthyweight      CDC home safety checklist: www.cdc.gov/steadi/patient.html      Agency on Aging: www.goea.[place].[facility]      Alcoholics anonymous (AA): www.aa.org      Physical Activity: www.[place].nih.gov/sr8tkir      Tobacco use: www.quitwithusla.org

## 2024-05-06 DIAGNOSIS — E78.5 HYPERLIPIDEMIA, UNSPECIFIED HYPERLIPIDEMIA TYPE: ICD-10-CM

## 2024-05-06 DIAGNOSIS — K55.1 SUPERIOR MESENTERIC ARTERY STENOSIS: ICD-10-CM

## 2024-05-06 DIAGNOSIS — I70.0 AORTIC ATHEROSCLEROSIS: ICD-10-CM

## 2024-05-06 RX ORDER — ATORVASTATIN CALCIUM 40 MG/1
40 TABLET, FILM COATED ORAL DAILY
Qty: 90 TABLET | Refills: 3 | Status: SHIPPED | OUTPATIENT
Start: 2024-05-06

## 2024-05-06 NOTE — TELEPHONE ENCOUNTER
Mili Best Elizabeth Staff  Caller: Self/ [phone] (Today,  2:15 PM)  Caller is requesting an earlier appointment then we can schedule.  Caller is requesting a message be sent to the provider.  If this is for urgent care symptoms, did you offer other providers at this location, providers at other locations, or [facility]? (yes, no, n/a):  n/a  If this is for the patients physical, did you offer to schedule next available and put on wait list, or to see NP or PA for their physical?  (yes, no, n/a):  n/a  When is the next available appointment with their provider:  ?  Reason for the appointment:  follow up  Patient preference of timeframe to be scheduled:  7/1-7/2  Would the patient like a call back, or a response through their [facility] portal?:   call back  Comments:

## 2024-05-06 NOTE — TELEPHONE ENCOUNTER
----- Message from Mili Best sent at 5/6/2024  2:09 PM CDT -----  Contact: Self/[phone]  Requesting an RX refill or new RX.  Is this a refill or new RX: New  RX name and strength : atorvastatin (LIPITOR) 40 MG tablet  Is this a 30 day or 90 day RX: 90  Pharmacy name and phone #:  [id number] [place] [facility] [address]  [address] [facility] [address]  Phone: [phone] Fax: [phone]     The doctors have asked that we provide their patients with the following 2 reminders -- prescription refills can take up to 72 hours, and a friendly reminder that in the future you can use your [facility] account to request refills:

## 2024-07-01 ENCOUNTER — LAB VISIT (OUTPATIENT)
Dept: LAB | Facility: HOSPITAL | Age: 79
End: 2024-07-01
Payer: MEDICARE

## 2024-07-01 DIAGNOSIS — K55.1 SUPERIOR MESENTERIC ARTERY STENOSIS: ICD-10-CM

## 2024-07-01 DIAGNOSIS — I70.0 AORTIC ATHEROSCLEROSIS: ICD-10-CM

## 2024-07-01 DIAGNOSIS — E78.5 HYPERLIPIDEMIA, UNSPECIFIED HYPERLIPIDEMIA TYPE: ICD-10-CM

## 2024-07-01 LAB
ALBUMIN SERPL BCP-MCNC: 4.1 G/DL (ref 3.5–5.2)
ALP SERPL-CCNC: 75 U/L (ref 55–135)
ALT SERPL W/O P-5'-P-CCNC: 28 U/L (ref 10–44)
ANION GAP SERPL CALC-SCNC: 9 MMOL/L (ref 8–16)
AST SERPL-CCNC: 22 U/L (ref 10–40)
BASOPHILS # BLD AUTO: 0.07 K/UL (ref 0–0.2)
BASOPHILS NFR BLD: 0.9 % (ref 0–1.9)
BILIRUB SERPL-MCNC: 1.2 MG/DL (ref 0.1–1)
BUN SERPL-MCNC: 16 MG/DL (ref 8–23)
CALCIUM SERPL-MCNC: 9.6 MG/DL (ref 8.7–10.5)
CHLORIDE SERPL-SCNC: 106 MMOL/L (ref 95–110)
CHOLEST SERPL-MCNC: 118 MG/DL (ref 120–199)
CHOLEST/HDLC SERPL: 3.5 {RATIO} (ref 2–5)
CO2 SERPL-SCNC: 25 MMOL/L (ref 23–29)
CREAT SERPL-MCNC: 1.2 MG/DL (ref 0.5–1.4)
DIFFERENTIAL METHOD BLD: ABNORMAL
EOSINOPHIL # BLD AUTO: 0.7 K/UL (ref 0–0.5)
EOSINOPHIL NFR BLD: 8.6 % (ref 0–8)
ERYTHROCYTE [DISTWIDTH] IN BLOOD BY AUTOMATED COUNT: 13.2 % (ref 11.5–14.5)
EST. GFR  (NO RACE VARIABLE): >60 ML/MIN/1.73 M^2
GLUCOSE SERPL-MCNC: 112 MG/DL (ref 70–110)
HCT VFR BLD AUTO: 47.6 % (ref 40–54)
HDLC SERPL-MCNC: 34 MG/DL (ref 40–75)
HDLC SERPL: 28.8 % (ref 20–50)
HGB BLD-MCNC: 16.4 G/DL (ref 14–18)
IMM GRANULOCYTES # BLD AUTO: 0.02 K/UL (ref 0–0.04)
IMM GRANULOCYTES NFR BLD AUTO: 0.3 % (ref 0–0.5)
LDLC SERPL CALC-MCNC: 62.2 MG/DL (ref 63–159)
LYMPHOCYTES # BLD AUTO: 1.7 K/UL (ref 1–4.8)
LYMPHOCYTES NFR BLD: 22.2 % (ref 18–48)
MCH RBC QN AUTO: 31.5 PG (ref 27–31)
MCHC RBC AUTO-ENTMCNC: 34.5 G/DL (ref 32–36)
MCV RBC AUTO: 91 FL (ref 82–98)
MONOCYTES # BLD AUTO: 0.8 K/UL (ref 0.3–1)
MONOCYTES NFR BLD: 10.2 % (ref 4–15)
NEUTROPHILS # BLD AUTO: 4.4 K/UL (ref 1.8–7.7)
NEUTROPHILS NFR BLD: 57.8 % (ref 38–73)
NONHDLC SERPL-MCNC: 84 MG/DL
NRBC BLD-RTO: 0 /100 WBC
PLATELET # BLD AUTO: 305 K/UL (ref 150–450)
PMV BLD AUTO: 9.5 FL (ref 9.2–12.9)
POTASSIUM SERPL-SCNC: 4.2 MMOL/L (ref 3.5–5.1)
PROT SERPL-MCNC: 7.4 G/DL (ref 6–8.4)
RBC # BLD AUTO: 5.21 M/UL (ref 4.6–6.2)
SODIUM SERPL-SCNC: 140 MMOL/L (ref 136–145)
TRIGL SERPL-MCNC: 109 MG/DL (ref 30–150)
WBC # BLD AUTO: 7.53 K/UL (ref 3.9–12.7)

## 2024-07-01 PROCEDURE — 36415 COLL VENOUS BLD VENIPUNCTURE: CPT | Performed by: INTERNAL MEDICINE

## 2024-07-01 PROCEDURE — 80061 LIPID PANEL: CPT | Performed by: INTERNAL MEDICINE

## 2024-07-01 PROCEDURE — 80053 COMPREHEN METABOLIC PANEL: CPT | Performed by: INTERNAL MEDICINE

## 2024-07-01 PROCEDURE — 85025 COMPLETE CBC W/AUTO DIFF WBC: CPT | Performed by: INTERNAL MEDICINE

## 2024-07-08 ENCOUNTER — OFFICE VISIT (OUTPATIENT)
Dept: PRIMARY CARE CLINIC | Facility: CLINIC | Age: 79
End: 2024-07-08
Payer: MEDICARE

## 2024-07-08 VITALS
BODY MASS INDEX: 25.78 KG/M2 | SYSTOLIC BLOOD PRESSURE: 155 MMHG | WEIGHT: 174.06 LBS | OXYGEN SATURATION: 97 % | TEMPERATURE: 98 F | HEIGHT: 69 IN | DIASTOLIC BLOOD PRESSURE: 64 MMHG | HEART RATE: 73 BPM

## 2024-07-08 DIAGNOSIS — R73.02 IGT (IMPAIRED GLUCOSE TOLERANCE): ICD-10-CM

## 2024-07-08 DIAGNOSIS — R03.0 ELEVATED BLOOD PRESSURE READING: ICD-10-CM

## 2024-07-08 DIAGNOSIS — E78.5 HYPERLIPIDEMIA, UNSPECIFIED HYPERLIPIDEMIA TYPE: Primary | ICD-10-CM

## 2024-07-08 PROCEDURE — 99214 OFFICE O/P EST MOD 30 MIN: CPT | Mod: PBBFAC,PN | Performed by: INTERNAL MEDICINE

## 2024-07-08 PROCEDURE — 99999 PR PBB SHADOW E&M-EST. PATIENT-LVL IV: CPT | Mod: PBBFAC,,, | Performed by: INTERNAL MEDICINE

## 2024-07-08 PROCEDURE — 99214 OFFICE O/P EST MOD 30 MIN: CPT | Mod: S$PBB,,, | Performed by: INTERNAL MEDICINE

## 2024-07-08 NOTE — PATIENT INSTRUCTIONS
Start checking blood pressures every day and write it down. Goal blood pressure is <130/80. If it remains elevated above goal, we may have to discuss BP meds next time.

## 2024-07-08 NOTE — PROGRESS NOTES
INTERNAL MEDICINE ANNUAL VISIT NOTE      CHIEF COMPLAINT     Chief Complaint   Patient presents with    Annual Exam       HPI     Jesese Dubose is a 78 y.o. C male who presents for annual.  Cscope - 12/23/21 - normal. 2mm polyp. Diverticulosis. Internal hemorrhoids. Repeat in 5 yrs. Dr. Brian.  Lives w/ wife. No assistance w/ ADLs - cuts the grass sometimes; wife does most of the housework just because does not drive. Wife drives him around. Watches TV most of the time and reads. Not a lot of exercising. Wife has exercise bike.     HLD - atorvastatin 40mg daily.   LDL 62.2 7/1/24    IGT - glucose 112.  Eats a lot of starch - German bread, potatoes, etc.     Wife has a BP machine at home but doesn't check it himself.  Drinks 2 cups of coffee in the AM. Drinks mostly water. Does not drink soft drinks. Sometimes drinks cranberry juice.     Takes zyrtec 10mg daily.     Past Medical History:  Past Medical History:   Diagnosis Date    Allergy     Cataract     Diverticulosis     Hyperlipidemia     Joint pain     SMA stenosis     Steatosis of liver     Urticaria        Past Surgical History:  Past Surgical History:   Procedure Laterality Date    APPENDECTOMY      CATARACT EXTRACTION  11/04/2013    right & left eye    CATARACT EXTRACTION W/  INTRAOCULAR LENS IMPLANT  11/18/2013    Left Eye/ Dr Gupta    COLONOSCOPY N/A 08/10/2016    Procedure: COLONOSCOPY;  Surgeon: Adolfo Moore MD;  Location: [address]);  Service: Endoscopy;  Laterality: N/A;  Patient has a history of Colon Polyps.    COLONOSCOPY N/A 12/23/2021    Procedure: COLONOSCOPY;  Surgeon: Jason Brian MD;  Location: [address]);  Service: Endoscopy;  Laterality: N/A;  Pt requesting 1st eli SINGLETON and December date/ fully vaccinated / prep ins. mailed - [facility]    TONSILLECTOMY         Allergies:  Review of patient's allergies indicates:   Allergen Reactions    Hay fever and allergy relief Other (See Comments)     Runny nose, sinus  congestion.       Home Medications:  Prior to Admission medications    Medication Sig Start Date End Date Taking? Authorizing Provider   atorvastatin (LIPITOR) 40 MG tablet Take 1 tablet (40 mg total) by mouth once daily. 5/6/24  Yes Elizabeth Bunch MD   co-enzyme Q-10 30 mg capsule Take 30 mg by mouth 3 (three) times daily.   Yes Provider, Spike   aspirin (ECOTRIN) 81 MG EC tablet Take 1 tablet (81 mg total) by mouth once daily. 3/21/23 4/22/24  Elizabeth Bunch MD   cetirizine (ZYRTEC) 10 MG tablet Take 1 tablet (10 mg total) by mouth once daily. 3/21/23 4/22/24  Elizabeth Bunch MD       Family History:  Family History   Problem Relation Name Age of Onset    Heart disease Father  66        MI    No Known Problems Brother      Cataracts Maternal Grandmother      Diabetes Maternal Grandmother      Cancer Maternal Grandmother      Diabetes Paternal Grandmother      Glaucoma Neg Hx      Blindness Neg Hx      Melanoma Neg Hx         Social History:  Social History     Tobacco Use    Smoking status: Never     Passive exposure: Never    Smokeless tobacco: Never   Substance Use Topics    Alcohol use: Yes     Comment: 1 beer every few months    Drug use: No       Review of Systems:  Review of Systems   Constitutional:  Negative for chills and fever.   HENT:  Positive for rhinorrhea. Negative for congestion and postnasal drip.    Eyes:  Negative for visual disturbance.   Respiratory:  Negative for cough, shortness of breath and wheezing.    Cardiovascular:  Negative for chest pain, palpitations and leg swelling.   Gastrointestinal:  Positive for constipation (sometimes. resolves w/ metamucil and raisin bran). Negative for abdominal pain, diarrhea, nausea and vomiting.   Genitourinary:  Negative for dysuria and hematuria.   Musculoskeletal:  Negative for arthralgias and back pain.   Skin:  Negative for rash.   Neurological:  Negative for dizziness, weakness, light-headedness, numbness and headaches.   Hematological:  Does not bruise/bleed  "easily.   Psychiatric/Behavioral:  Negative for dysphoric mood and sleep disturbance. The patient is not nervous/anxious.        Health Maintainence:   HM reviewed.    PHYSICAL EXAM     BP (!) 154/70 (BP Location: Left arm, Patient Position: Sitting, BP Method: Medium (Manual))   Pulse 73   Temp 97.8 °F (36.6 °C) (Oral)   Ht 5' 9" (1.753 m)   Wt 78.9 kg (174 lb 0.9 oz)   SpO2 97%   BMI 25.70 kg/m²     GEN - A+OX4, NAD   HEENT - PERRL, EOMI, OP clear. MMM. TM normal.   Neck - No thyromegaly or cervical LAD. No thyroid masses felt.  CV - RRR, no m/r   Chest - CTAB, no wheezing or rhonchi  Abd - S/NT/ND/+BS.   Ext - 2+BDP and radial pulses. No LE edema.   Neuro - PERRL, EOMI, no nystagmus, eyebrow raise, facial sensation, hearing, m of mastication, smile, palatal raise, shoulder shrug, tongue protrusion symmetric and intact. 5/5 BUE and BLE strength. Sensation to light touch intact throughout. 2+ DTRs. Normal gait.   MSK - No spinal tenderness to palpation. Normal gait.   Skin - No rash.    LABS     Previous labs reviewed.    ASSESSMENT/PLAN     Jessee Dubose is a 78 y.o. male with  Jessee was seen today for annual exam.    Diagnoses and all orders for this visit:    Hyperlipidemia, unspecified hyperlipidemia type - cont atorva 40.     Elevated blood pressure reading - Start checking blood pressures every day and write it down. Goal blood pressure is <130/80. If it remains elevated above goal, we may have to discuss BP meds next time.     IGT (impaired glucose tolerance) - eats a lot of carbs. Recommend portion control. Increase exercise.     Advance Care Planning     Date: 07/08/2024    Living Will  During this visit, I engaged the patient  in the voluntary advance care planning process.  The patient and I reviewed the role for advance directives and their purpose in directing future healthcare if the patient's unable to speak for him/herself.  At this point in time, the patient does have full " decision-making capacity.  We discussed different extreme health states that he could experience, and reviewed what kind of medical care he would want in those situations.  The patient communicated that if he were comatose and had little chance of a meaningful recovery, he would not want machines/life-sustaining treatments used. In addition to the above preference, other important end-of-life issues for the patient include  see paperwork . The patient has completed a living will to reflect these preferences and The patient has already designated a healthcare power of  to make decisions on his behalf.  I spent a total of 3 minutes engaging the patient in this advance care planning discussion.          Power of   I initiated the process of voluntary advance care planning today and explained the importance of this process to the patient.  I introduced the concept of advance directives to the patient, as well. Then the patient received detailed information about the importance of designating a Health Care Power of  (HCPOA). He was also instructed to communicate with this person about their wishes for future healthcare, should he become sick and lose decision-making capacity. The patient has previously appointed a HCPOA. After our discussion, the patient has decided to complete a HCPOA and has appointed his significant other, health care agent:  Suki  & health care agent number:  [phone] . I encouraged him to communicate with this person about their wishes for future healthcare, should he become sick and lose decision-making capacity.      A total of 3 min was spent on advance care planning, goals of care discussion, emotional support, formulating and communicating prognosis and exploring burden/benefit of various approaches of treatment. This discussion occurred on a fully voluntary basis with the verbal consent of the patient and/or family.       RTC in 4 weeks, sooner if needed and  depending on labs.    Elizabeth Bunch MD  Department of Internal Medicine - [facility]  9:02 AM

## 2024-08-05 ENCOUNTER — OFFICE VISIT (OUTPATIENT)
Dept: PRIMARY CARE CLINIC | Facility: CLINIC | Age: 79
End: 2024-08-05
Payer: MEDICARE

## 2024-08-05 VITALS
BODY MASS INDEX: 24.97 KG/M2 | HEART RATE: 62 BPM | DIASTOLIC BLOOD PRESSURE: 68 MMHG | WEIGHT: 169.06 LBS | TEMPERATURE: 98 F | SYSTOLIC BLOOD PRESSURE: 132 MMHG | OXYGEN SATURATION: 98 %

## 2024-08-05 DIAGNOSIS — R03.0 ELEVATED BLOOD PRESSURE READING: Primary | ICD-10-CM

## 2024-08-05 PROCEDURE — 99999 PR PBB SHADOW E&M-EST. PATIENT-LVL IV: CPT | Mod: PBBFAC,,, | Performed by: INTERNAL MEDICINE

## 2024-08-05 PROCEDURE — 99214 OFFICE O/P EST MOD 30 MIN: CPT | Mod: PBBFAC,PN | Performed by: INTERNAL MEDICINE

## 2024-08-05 PROCEDURE — 99212 OFFICE O/P EST SF 10 MIN: CPT | Mod: S$PBB,,, | Performed by: INTERNAL MEDICINE

## 2025-02-05 ENCOUNTER — OFFICE VISIT (OUTPATIENT)
Dept: PRIMARY CARE CLINIC | Facility: CLINIC | Age: 80
End: 2025-02-05
Payer: MEDICARE

## 2025-02-05 VITALS
HEIGHT: 69 IN | TEMPERATURE: 98 F | SYSTOLIC BLOOD PRESSURE: 150 MMHG | OXYGEN SATURATION: 96 % | DIASTOLIC BLOOD PRESSURE: 84 MMHG | HEART RATE: 70 BPM | WEIGHT: 169 LBS | BODY MASS INDEX: 25.03 KG/M2

## 2025-02-05 DIAGNOSIS — R73.02 IGT (IMPAIRED GLUCOSE TOLERANCE): ICD-10-CM

## 2025-02-05 DIAGNOSIS — L98.9 SKIN LESION: ICD-10-CM

## 2025-02-05 DIAGNOSIS — R03.0 ELEVATED BLOOD PRESSURE READING: ICD-10-CM

## 2025-02-05 DIAGNOSIS — E78.5 HYPERLIPIDEMIA, UNSPECIFIED HYPERLIPIDEMIA TYPE: Primary | ICD-10-CM

## 2025-02-05 DIAGNOSIS — K55.1 SUPERIOR MESENTERIC ARTERY STENOSIS: ICD-10-CM

## 2025-02-05 DIAGNOSIS — I70.0 AORTIC ATHEROSCLEROSIS: ICD-10-CM

## 2025-02-05 PROCEDURE — 99214 OFFICE O/P EST MOD 30 MIN: CPT | Mod: S$GLB,,, | Performed by: INTERNAL MEDICINE

## 2025-02-05 PROCEDURE — 99999 PR PBB SHADOW E&M-EST. PATIENT-LVL III: CPT | Mod: PBBFAC,,, | Performed by: INTERNAL MEDICINE

## 2025-02-05 PROCEDURE — 1101F PT FALLS ASSESS-DOCD LE1/YR: CPT | Mod: CPTII,S$GLB,, | Performed by: INTERNAL MEDICINE

## 2025-02-05 PROCEDURE — 3288F FALL RISK ASSESSMENT DOCD: CPT | Mod: CPTII,S$GLB,, | Performed by: INTERNAL MEDICINE

## 2025-02-05 PROCEDURE — 3077F SYST BP >= 140 MM HG: CPT | Mod: CPTII,S$GLB,, | Performed by: INTERNAL MEDICINE

## 2025-02-05 PROCEDURE — 1159F MED LIST DOCD IN RCRD: CPT | Mod: CPTII,S$GLB,, | Performed by: INTERNAL MEDICINE

## 2025-02-05 PROCEDURE — 1158F ADVNC CARE PLAN TLK DOCD: CPT | Mod: CPTII,S$GLB,, | Performed by: INTERNAL MEDICINE

## 2025-02-05 PROCEDURE — 3079F DIAST BP 80-89 MM HG: CPT | Mod: CPTII,S$GLB,, | Performed by: INTERNAL MEDICINE

## 2025-02-05 PROCEDURE — 1126F AMNT PAIN NOTED NONE PRSNT: CPT | Mod: CPTII,S$GLB,, | Performed by: INTERNAL MEDICINE

## 2025-02-05 PROCEDURE — 1160F RVW MEDS BY RX/DR IN RCRD: CPT | Mod: CPTII,S$GLB,, | Performed by: INTERNAL MEDICINE

## 2025-02-05 RX ORDER — ATORVASTATIN CALCIUM 40 MG/1
40 TABLET, FILM COATED ORAL DAILY
Qty: 90 TABLET | Refills: 3 | Status: SHIPPED | OUTPATIENT
Start: 2025-02-05

## 2025-02-05 NOTE — PROGRESS NOTES
"Subjective:       Patient ID: Jessee Dubose is a 79 y.o. male.    Chief Complaint: Follow-up    HPI  Still does not exercise much.   Cscope - 12/23/21 - normal. 2mm polyp. Diverticulosis. Internal hemorrhoids. Repeat in 5 yrs. Dr. Brian.     HLD - atorvastatin 40mg daily  CT A/P 1/13/22 - Vasculature: No aneurysm. Moderate aortoiliac calcific atherosclerosis.   Mesenteric art stenosis.   LDL 62.2 7/1/24    IGT - glucose mildly elevated.     Has a lesion on the R side of his neck that he wants looked at.     Review of Systems   Constitutional:  Negative for activity change and unexpected weight change.   HENT:  Negative for hearing loss, rhinorrhea and trouble swallowing.    Eyes:  Negative for discharge and visual disturbance.   Respiratory:  Negative for chest tightness and wheezing.    Cardiovascular:  Negative for chest pain and palpitations.   Gastrointestinal:  Negative for blood in stool, constipation, diarrhea and vomiting.   Endocrine: Negative for polydipsia and polyuria.   Genitourinary:  Negative for difficulty urinating, hematuria and urgency.   Musculoskeletal:  Negative for arthralgias, joint swelling and neck pain.   Neurological:  Negative for weakness and headaches.   Psychiatric/Behavioral:  Negative for confusion and dysphoric mood.          Objective:      Physical Exam    BP (!) 150/84   Pulse 70   Temp 97.6 °F (36.4 °C) (Oral)   Ht 5' 9" (1.753 m)   Wt 76.6 kg (168 lb 15.7 oz)   SpO2 96%   BMI 24.95 kg/m²     GEN - A+OX4, NAD   HEENT - PERRL, EOMI, OP clear. MMM.   Neck - No thyromegaly or cervical LAD. No thyroid masses felt.  CV - RRR, no m/r   Chest - CTAB, no wheezing or rhonchi  Abd - S/NT/ND/+BS.   Ext - 2+BDP and radial pulses. No C/C/E.  Neuro - 5/5 BUE and BLE strength. Normal gait.   Skin - No rash.    Labs reviewed.     Assessment/Plan     Jessee was seen today for follow-up.    Diagnoses and all orders for this visit:    Hyperlipidemia, unspecified hyperlipidemia " type  -     atorvastatin (LIPITOR) 40 MG tablet; Take 1 tablet (40 mg total) by mouth once daily.  -     Comprehensive Metabolic Panel; Future  -     Lipid Panel; Future    Superior mesenteric artery stenosis  -     atorvastatin (LIPITOR) 40 MG tablet; Take 1 tablet (40 mg total) by mouth once daily.  -     Comprehensive Metabolic Panel; Future  -     Lipid Panel; Future    Aortic atherosclerosis  -     atorvastatin (LIPITOR) 40 MG tablet; Take 1 tablet (40 mg total) by mouth once daily.  -     Comprehensive Metabolic Panel; Future  -     Lipid Panel; Future    Skin lesion  -     Ambulatory referral/consult to Dermatology; Future  -     CBC Auto Differential; Future    IGT (impaired glucose tolerance)  -     Hemoglobin A1C; Future    Elevated blood pressure reading  Had elevated in office BP readings in the past but home BP were ok. Monitor BPs at home. RN to call in 2 weeks to get BP readings.    Advance Care Planning     Date: 02/05/2025    ACP Reviewed/No Changes  Voluntary advance care planning discussion had today with patient. Previously completed HCPOA and LW in electronic medical record is current, no changes made.      A total of 2 min was spent on advance care planning, goals of care discussion, emotional support, formulating and communicating prognosis and exploring burden/benefit of various approaches of treatment. This discussion occurred on a fully voluntary basis with the verbal consent of the patient and/or family.         Follow up in about 6 months (around 8/5/2025).      Elizabeth Bunch MD  Department of Internal Medicine - [facility]  12:37 PM

## 2025-02-06 ENCOUNTER — LAB VISIT (OUTPATIENT)
Dept: LAB | Facility: HOSPITAL | Age: 80
End: 2025-02-06
Attending: INTERNAL MEDICINE
Payer: MEDICARE

## 2025-02-06 DIAGNOSIS — R73.02 IGT (IMPAIRED GLUCOSE TOLERANCE): ICD-10-CM

## 2025-02-06 DIAGNOSIS — I70.0 AORTIC ATHEROSCLEROSIS: ICD-10-CM

## 2025-02-06 DIAGNOSIS — E78.5 HYPERLIPIDEMIA, UNSPECIFIED HYPERLIPIDEMIA TYPE: ICD-10-CM

## 2025-02-06 DIAGNOSIS — L98.9 SKIN LESION: ICD-10-CM

## 2025-02-06 DIAGNOSIS — K55.1 SUPERIOR MESENTERIC ARTERY STENOSIS: ICD-10-CM

## 2025-02-06 LAB
ALBUMIN SERPL BCP-MCNC: 3.8 G/DL (ref 3.5–5.2)
ALP SERPL-CCNC: 70 U/L (ref 40–150)
ALT SERPL W/O P-5'-P-CCNC: 24 U/L (ref 10–44)
ANION GAP SERPL CALC-SCNC: 9 MMOL/L (ref 8–16)
AST SERPL-CCNC: 20 U/L (ref 10–40)
BASOPHILS # BLD AUTO: 0.07 K/UL (ref 0–0.2)
BASOPHILS NFR BLD: 1 % (ref 0–1.9)
BILIRUB SERPL-MCNC: 1.1 MG/DL (ref 0.1–1)
BUN SERPL-MCNC: 24 MG/DL (ref 8–23)
CALCIUM SERPL-MCNC: 9.2 MG/DL (ref 8.7–10.5)
CHLORIDE SERPL-SCNC: 107 MMOL/L (ref 95–110)
CHOLEST SERPL-MCNC: 109 MG/DL (ref 120–199)
CHOLEST/HDLC SERPL: 3 {RATIO} (ref 2–5)
CO2 SERPL-SCNC: 26 MMOL/L (ref 23–29)
CREAT SERPL-MCNC: 1 MG/DL (ref 0.5–1.4)
DIFFERENTIAL METHOD BLD: ABNORMAL
EOSINOPHIL # BLD AUTO: 0.7 K/UL (ref 0–0.5)
EOSINOPHIL NFR BLD: 10.2 % (ref 0–8)
ERYTHROCYTE [DISTWIDTH] IN BLOOD BY AUTOMATED COUNT: 13 % (ref 11.5–14.5)
EST. GFR  (NO RACE VARIABLE): >60 ML/MIN/1.73 M^2
ESTIMATED AVG GLUCOSE: 105 MG/DL (ref 68–131)
GLUCOSE SERPL-MCNC: 108 MG/DL (ref 70–110)
HBA1C MFR BLD: 5.3 % (ref 4–5.6)
HCT VFR BLD AUTO: 43.5 % (ref 40–54)
HDLC SERPL-MCNC: 36 MG/DL (ref 40–75)
HDLC SERPL: 33 % (ref 20–50)
HGB BLD-MCNC: 14.8 G/DL (ref 14–18)
IMM GRANULOCYTES # BLD AUTO: 0.02 K/UL (ref 0–0.04)
IMM GRANULOCYTES NFR BLD AUTO: 0.3 % (ref 0–0.5)
LDLC SERPL CALC-MCNC: 56 MG/DL (ref 63–159)
LYMPHOCYTES # BLD AUTO: 1.5 K/UL (ref 1–4.8)
LYMPHOCYTES NFR BLD: 20 % (ref 18–48)
MCH RBC QN AUTO: 31.4 PG (ref 27–31)
MCHC RBC AUTO-ENTMCNC: 34 G/DL (ref 32–36)
MCV RBC AUTO: 92 FL (ref 82–98)
MONOCYTES # BLD AUTO: 0.7 K/UL (ref 0.3–1)
MONOCYTES NFR BLD: 10.2 % (ref 4–15)
NEUTROPHILS # BLD AUTO: 4.3 K/UL (ref 1.8–7.7)
NEUTROPHILS NFR BLD: 58.3 % (ref 38–73)
NONHDLC SERPL-MCNC: 73 MG/DL
NRBC BLD-RTO: 0 /100 WBC
PLATELET # BLD AUTO: 278 K/UL (ref 150–450)
PMV BLD AUTO: 9.8 FL (ref 9.2–12.9)
POTASSIUM SERPL-SCNC: 4.4 MMOL/L (ref 3.5–5.1)
PROT SERPL-MCNC: 7 G/DL (ref 6–8.4)
RBC # BLD AUTO: 4.71 M/UL (ref 4.6–6.2)
SODIUM SERPL-SCNC: 142 MMOL/L (ref 136–145)
TRIGL SERPL-MCNC: 85 MG/DL (ref 30–150)
WBC # BLD AUTO: 7.29 K/UL (ref 3.9–12.7)

## 2025-02-06 PROCEDURE — 85025 COMPLETE CBC W/AUTO DIFF WBC: CPT | Performed by: INTERNAL MEDICINE

## 2025-02-06 PROCEDURE — 80061 LIPID PANEL: CPT | Performed by: INTERNAL MEDICINE

## 2025-02-06 PROCEDURE — 36415 COLL VENOUS BLD VENIPUNCTURE: CPT | Performed by: INTERNAL MEDICINE

## 2025-02-06 PROCEDURE — 83036 HEMOGLOBIN GLYCOSYLATED A1C: CPT | Performed by: INTERNAL MEDICINE

## 2025-02-06 PROCEDURE — 80053 COMPREHEN METABOLIC PANEL: CPT | Performed by: INTERNAL MEDICINE

## 2025-02-20 ENCOUNTER — TELEPHONE (OUTPATIENT)
Dept: PRIMARY CARE CLINIC | Facility: CLINIC | Age: 80
End: 2025-02-20
Payer: MEDICARE

## 2025-03-24 ENCOUNTER — TELEPHONE (OUTPATIENT)
Dept: PRIMARY CARE CLINIC | Facility: CLINIC | Age: 80
End: 2025-03-24
Payer: MEDICARE

## 2025-03-24 DIAGNOSIS — Z00.00 ENCOUNTER FOR MEDICARE ANNUAL WELLNESS EXAM: ICD-10-CM

## 2025-03-28 ENCOUNTER — TELEPHONE (OUTPATIENT)
Dept: PRIMARY CARE CLINIC | Facility: CLINIC | Age: 80
End: 2025-03-28
Payer: MEDICARE

## 2025-03-31 ENCOUNTER — TELEPHONE (OUTPATIENT)
Dept: PRIMARY CARE CLINIC | Facility: CLINIC | Age: 80
End: 2025-03-31
Payer: MEDICARE

## 2025-03-31 ENCOUNTER — TELEPHONE (OUTPATIENT)
Dept: OPHTHALMOLOGY | Facility: CLINIC | Age: 80
End: 2025-03-31
Payer: MEDICARE

## 2025-03-31 DIAGNOSIS — H57.9 ITCHY, WATERY, AND RED EYE: Primary | ICD-10-CM

## 2025-03-31 NOTE — TELEPHONE ENCOUNTER
Spoke w/ pt, let him know that Dr Bunch put in Opthalm referral, and we will send a chart message to Dr. Matos & Staff for them to call him for appt.     Pt will also send in some recent blood pressures, says his b/p runs high in am usually better during day. Pt states he is not on any blood pressure meds, and does not wish to start taking any.

## 2025-03-31 NOTE — TELEPHONE ENCOUNTER
----- Message from Tony sent at 3/31/2025  1:31 PM CDT -----  Contact: [phone]  [email or web address] Patient is calling for Medical Advice regarding:pt is calling in regards to a referral for opthalmology apt and needs someone to call as soon as possible Patient wants a call back or thru [facility]:call back Comments:Please advise patient replies from provider may take up to 48 hours.

## 2025-03-31 NOTE — TELEPHONE ENCOUNTER
Mr. Hooks is requesting a ref for Ophthalmology he states he is experiencing itchy and watery eyes and he states he had cataract surgery a couple years ago and would like to be seen by Opth/Dr. Matos.

## 2025-05-20 ENCOUNTER — OFFICE VISIT (OUTPATIENT)
Dept: OPTOMETRY | Facility: CLINIC | Age: 80
End: 2025-05-20
Payer: MEDICARE

## 2025-05-20 DIAGNOSIS — Z96.1 PSEUDOPHAKIA OF BOTH EYES: Primary | ICD-10-CM

## 2025-05-20 DIAGNOSIS — H04.123 DRY EYE SYNDROME OF BILATERAL LACRIMAL GLANDS: ICD-10-CM

## 2025-05-20 PROCEDURE — 3288F FALL RISK ASSESSMENT DOCD: CPT | Mod: CPTII,S$GLB,, | Performed by: OPTOMETRIST

## 2025-05-20 PROCEDURE — 1126F AMNT PAIN NOTED NONE PRSNT: CPT | Mod: CPTII,S$GLB,, | Performed by: OPTOMETRIST

## 2025-05-20 PROCEDURE — 92004 COMPRE OPH EXAM NEW PT 1/>: CPT | Mod: S$GLB,,, | Performed by: OPTOMETRIST

## 2025-05-20 PROCEDURE — 99999 PR PBB SHADOW E&M-EST. PATIENT-LVL II: CPT | Mod: PBBFAC,,, | Performed by: OPTOMETRIST

## 2025-05-20 PROCEDURE — 1101F PT FALLS ASSESS-DOCD LE1/YR: CPT | Mod: CPTII,S$GLB,, | Performed by: OPTOMETRIST

## 2025-05-20 PROCEDURE — 1159F MED LIST DOCD IN RCRD: CPT | Mod: CPTII,S$GLB,, | Performed by: OPTOMETRIST

## 2025-05-20 NOTE — PROGRESS NOTES
HPI    Pt is here today for routine eye exam. Denies pain/discomfort.  DLS: 2 Years Ago  (-)Flashes   (-)Floaters   (-)Diplopia   (-)Headaches   (+)Itching   (+)Tearing  (+)Burning  (+)Dryness   (+)Photophobia  (-)Glare   (-)Blurred VA  Past Eye Sx: PC IOL OU   Eye Meds: Visine PRN OU   Last edited by Catherine Ball, OD on 5/20/2025  9:30 AM.            Assessment /Plan     For exam results, see Encounter Report.    Pseudophakia of both eyes  -     Ambulatory referral/consult to Ophthalmology    Dry eye syndrome of bilateral lacrimal glands      1. Monitor; pt educated on condition and visual status.    Continue use of OTC reading glasses prn. Monitor yearly.      2. Educated pt on findings. D/C VISINE. Recommended Systane/Refresh ATs QD-BID OU.  If symptoms worsen or dont improve, RTC. Monitor.      RTC in 1 year for annual eye exam unless changes noted sooner.

## 2025-08-05 ENCOUNTER — OFFICE VISIT (OUTPATIENT)
Dept: PRIMARY CARE CLINIC | Facility: CLINIC | Age: 80
End: 2025-08-05
Payer: MEDICARE

## 2025-08-05 VITALS
BODY MASS INDEX: 24.88 KG/M2 | HEART RATE: 64 BPM | DIASTOLIC BLOOD PRESSURE: 64 MMHG | TEMPERATURE: 98 F | WEIGHT: 168 LBS | OXYGEN SATURATION: 97 % | SYSTOLIC BLOOD PRESSURE: 128 MMHG | HEIGHT: 69 IN

## 2025-08-05 DIAGNOSIS — J43.2 CENTRILOBULAR EMPHYSEMA: ICD-10-CM

## 2025-08-05 DIAGNOSIS — R97.20 ELEVATED PROSTATE SPECIFIC ANTIGEN (PSA): ICD-10-CM

## 2025-08-05 DIAGNOSIS — I70.0 AORTIC ATHEROSCLEROSIS: ICD-10-CM

## 2025-08-05 DIAGNOSIS — E78.5 HYPERLIPIDEMIA, UNSPECIFIED HYPERLIPIDEMIA TYPE: Primary | ICD-10-CM

## 2025-08-05 DIAGNOSIS — R73.02 IGT (IMPAIRED GLUCOSE TOLERANCE): ICD-10-CM

## 2025-08-05 DIAGNOSIS — R63.4 UNINTENTIONAL WEIGHT LOSS: ICD-10-CM

## 2025-08-05 DIAGNOSIS — K55.1 SUPERIOR MESENTERIC ARTERY STENOSIS: ICD-10-CM

## 2025-08-05 PROCEDURE — 1170F FXNL STATUS ASSESSED: CPT | Mod: CPTII,S$GLB,, | Performed by: INTERNAL MEDICINE

## 2025-08-05 PROCEDURE — G2211 COMPLEX E/M VISIT ADD ON: HCPCS | Mod: S$GLB,,, | Performed by: INTERNAL MEDICINE

## 2025-08-05 PROCEDURE — 99999 PR PBB SHADOW E&M-EST. PATIENT-LVL IV: CPT | Mod: PBBFAC,,, | Performed by: INTERNAL MEDICINE

## 2025-08-05 PROCEDURE — 1160F RVW MEDS BY RX/DR IN RCRD: CPT | Mod: CPTII,S$GLB,, | Performed by: INTERNAL MEDICINE

## 2025-08-05 PROCEDURE — 1101F PT FALLS ASSESS-DOCD LE1/YR: CPT | Mod: CPTII,S$GLB,, | Performed by: INTERNAL MEDICINE

## 2025-08-05 PROCEDURE — 99214 OFFICE O/P EST MOD 30 MIN: CPT | Mod: S$GLB,,, | Performed by: INTERNAL MEDICINE

## 2025-08-05 PROCEDURE — 1126F AMNT PAIN NOTED NONE PRSNT: CPT | Mod: CPTII,S$GLB,, | Performed by: INTERNAL MEDICINE

## 2025-08-05 PROCEDURE — 3078F DIAST BP <80 MM HG: CPT | Mod: CPTII,S$GLB,, | Performed by: INTERNAL MEDICINE

## 2025-08-05 PROCEDURE — 3074F SYST BP LT 130 MM HG: CPT | Mod: CPTII,S$GLB,, | Performed by: INTERNAL MEDICINE

## 2025-08-05 PROCEDURE — 1159F MED LIST DOCD IN RCRD: CPT | Mod: CPTII,S$GLB,, | Performed by: INTERNAL MEDICINE

## 2025-08-05 PROCEDURE — 3288F FALL RISK ASSESSMENT DOCD: CPT | Mod: CPTII,S$GLB,, | Performed by: INTERNAL MEDICINE

## 2025-08-05 RX ORDER — ATORVASTATIN CALCIUM 40 MG/1
40 TABLET, FILM COATED ORAL DAILY
Qty: 90 TABLET | Refills: 3 | Status: SHIPPED | OUTPATIENT
Start: 2025-08-05

## 2025-08-05 NOTE — PROGRESS NOTES
Subjective:       Patient ID: Jessee Dubose is a 79 y.o. male.    Chief Complaint: Follow-up    HPI  Had eye exam w/Dr. Ball in optometry 5/20/25.   Lives w/ wife, Suki.    HLD - atorvastatin 40mg daily.   Ldl 56 2/6/25  Mesenteric US 2022 - SMA stenosis. ASA 81mg daily.  Duplex imaging reveals accelerated velocities and a focal narrowing at the Proximal SMA with a PSV of 379 cm/sec (previously 327   cm/sec). These findings suggest a > 70% stenosis. The Aorta, Celiac and Inferior mesenteric arteries appear to be widely patent.   Appetite ok. No nausea/vomiting. If he doesn't eat he may feel a little a discomfort in the epigastric area. Better if he eats. If he doesn't take metamucil he'll be constipated. Doesn't have teeth and so eat mostly canned soups, mashed potatoes and tender cubed steaks.     Mildly elevated eosinophils on CBC for the last few yrs. WBC/ANC WNL.  Sometimes w/ the runny nose that he attributes to hay fever. Would take some OTC zyrtec/allegra. Sometimes w/ itchy skin.     CT A/P 1/13/2022 - if he's in the heat, he may get SOB when he tries to be physical. Would walk at [facility] during the fall/winter months and do just fine. Never smoker. No cough.    Lung centrilobular emphysematous changes. 1.1 cm nodular opacity with surrounding ground-glass attenuation within the left lower lobe (axial series 2, image 25), stable dating back to at least February 2015. Given long-term stability, this may relate to scarring. Cholelithiasis.  Cscope 12/2021 - repeat in 5 yrs. Polyp neg for dysplasia.(Tubular adenoma on Cscope 2013)  Impression:            - The examined portion of the ileum was normal.                          - One 2 mm polyp in the cecum, removed with a cold                          snare. Resected and retrieved.                          - Diverticulosis in the recto-sigmoid colon, in                          the sigmoid colon, in the descending colon, in the                           transverse colon and in the ascending colon.                          - Non-bleeding internal hemorrhoids.     Elevated PSA. S/p prostate biopsies in 2016 that was neg x 1 bx that showed acute inflammation.  May wake up twice at night to urinate. No incontinence. Drinks 2 cups of coffee and water through the day. Wife cuts the lawn.     4Ms for Medical Decision-Making in Older Adults    Last Completed EAWV: 2024    MEDICATIONS:  High Risk Medications:  Total Active Medications: 0  This patient does not have an active medication from one of the medication groupers.    MOBILITY:  Activities of Daily Livin/5/2025     9:21 AM   Activities of Daily Living   Ambulation Independent   Dressing Independent   Transfers Independent   Toileting Continent of bladder;Continent of bowel   Feeding Independent   Cleaning home/Chores Independent   Telephone use Independent   Shopping Independent   Paying bills Independent   Taking meds Independent     Fall Risk:      2025     9:00 AM 2025     9:00 AM 2025     1:00 PM   Fall Risk Assessment - Outpatient   Mobility Status Ambulatory Ambulatory Ambulatory   Number of falls 0 0 0   Identified as fall risk False False False     Disability Status:      2025     9:22 AM   Disability Status   Are you deaf or do you have serious difficulty hearing? N   Are you blind or do you have serious difficulty seeing, even when wearing glasses? N   Because of a physical, mental, or emotional condition, do you have serious difficulty concentrating, remembering, or making decisions? N   Do you have serious difficulty walking or climbing stairs? N   Do you have difficulty dressing or bathing? N   Because of a physical, mental, or emotional condition, do you have difficulty doing errands alone such as visiting a doctor's office or shopping? N     Nutrition Screenin/5/2025     9:22 AM   Nutrition Screening   Has food intake declined over the past three months due to  loss of appetite, digestive problems, chewing or swallowing difficulties? No decrease in food intake   Involuntary weight loss during the last 3 months? No weight loss   Mobility? Goes out   Has the patient suffered psychological stress or acute disease in the past three months? No   Neuropsychological problems? No psychological problems   Body Mass Index (BMI)?  BMI 23 or greater   Screening Score 14   Interpretation Normal nutritional status    Screening Score: 0-7 Malnourished, 8-11 At Risk, 12-14 Normal  Get Up and Go:      2025     9:09 AM 2024     8:03 AM   Get Up and Go   Trial 1 14 seconds 9 seconds     Whisper Test:      2025     9:09 AM   Whisper Test   Whisper Test Normal           MENTATION:   Has Dementia Dx: No  Has Anxiety Dx: No    Depression Patient Health Questionnaire:      2025     9:03 AM   Depression Patient Health Questionnaire   Over the last two weeks how often have you been bothered by little interest or pleasure in doing things Not at all   Over the last two weeks how often have you been bothered by feeling down, depressed or hopeless Not at all   PHQ-2 Total Score 0     Cognitive Function Screenin/5/2025     9:21 AM   Cognitive Function Screening   Clock Drawing Test 2   Mini-Cog 3 Minute Recall 3   Cognitive Function Screening 5     Cognitive Function Screening Total - Less than 4 = Abnormal,  Greater than or equal to 4 = Normal        WHAT MATTERS MOST:  Advance Care Planning   ACP Status:   Patient has had an ACP conversation  Living Will: Yes  Power of : No  POLST: No      Review of Systems   Constitutional:  Negative for activity change and unexpected weight change.   HENT:  Negative for hearing loss, rhinorrhea and trouble swallowing.    Eyes:  Negative for discharge and visual disturbance.   Respiratory:  Negative for chest tightness and wheezing.    Cardiovascular:  Negative for chest pain and palpitations.   Gastrointestinal:  Negative for blood  "in stool, constipation, diarrhea and vomiting.   Endocrine: Negative for polydipsia and polyuria.   Genitourinary:  Negative for difficulty urinating, hematuria and urgency.   Musculoskeletal:  Negative for arthralgias, joint swelling and neck pain.   Neurological:  Negative for weakness and headaches.   Psychiatric/Behavioral:  Negative for confusion and dysphoric mood.          Objective:      Physical Exam    /64   Pulse 64   Temp 98.1 °F (36.7 °C) (Oral)   Ht 5' 9" (1.753 m)   Wt 76.2 kg (167 lb 15.9 oz)   SpO2 97%   BMI 24.81 kg/m²     GEN - A+OX4, NAD   HEENT - PERRL, EOMI, OP clear. MMM.   Neck - No thyromegaly or cervical LAD. No thyroid masses felt.  CV - RRR, no m/r   Chest - CTAB, no wheezing or rhonchi  Abd - S/NT/ND/+BS.   Ext - 2+BDP and radial pulses. No C/C/E.  Neuro - 5/5 BUE and BLE strength. Normal gait.   Skin - No rash.     Labs reviewed.     Assessment/Plan     Jessee was seen today for follow-up.    Diagnoses and all orders for this visit:    Hyperlipidemia, unspecified hyperlipidemia type  -     atorvastatin (LIPITOR) 40 MG tablet; Take 1 tablet (40 mg total) by mouth once daily.  -     Comprehensive Metabolic Panel; Future  -     Lipid Panel; Future    Aortic atherosclerosis - LDL at goal. Cont atorvastatin.  -     atorvastatin (LIPITOR) 40 MG tablet; Take 1 tablet (40 mg total) by mouth once daily.  -     Comprehensive Metabolic Panel; Future  -     Lipid Panel; Future    SMA stenosis - asymptomatic. Occ w/ epigastric pain that's better w/ eating. Doesn't have symptoms regularly. Cont asa and atorva.  -     atorvastatin (LIPITOR) 40 MG tablet; Take 1 tablet (40 mg total) by mouth once daily.  -     Comprehensive Metabolic Panel; Future  -     Lipid Panel; Future    Centrilobular emphysema - never smoker. Asymptomatic. Seen on CT.   -     CBC Auto Differential; Future    Elevated prostate specific antigen (PSA) - BPH. Neg biopsy 2016.    IGT (impaired glucose tolerance)  -     " Hemoglobin A1C; Future    Unintentional weight loss - weight stabilized.   -     TSH; Future  -     CBC Auto Differential; Future        Follow up in about 6 months (around 2/5/2026).      Elizabeth Bunch MD  Department of Internal Medicine - [facility]  7:59 AM